# Patient Record
Sex: MALE | Race: BLACK OR AFRICAN AMERICAN | NOT HISPANIC OR LATINO | Employment: OTHER | URBAN - METROPOLITAN AREA
[De-identification: names, ages, dates, MRNs, and addresses within clinical notes are randomized per-mention and may not be internally consistent; named-entity substitution may affect disease eponyms.]

---

## 2020-01-08 ENCOUNTER — HISTORICAL (OUTPATIENT)
Dept: ADMINISTRATIVE | Facility: HOSPITAL | Age: 65
End: 2020-01-08

## 2020-01-10 LAB — FINAL CULTURE: NORMAL

## 2020-10-16 ENCOUNTER — TELEPHONE (OUTPATIENT)
Dept: NEUROSURGERY | Facility: CLINIC | Age: 65
End: 2020-10-16

## 2020-11-02 ENCOUNTER — TELEPHONE (OUTPATIENT)
Dept: NEUROSURGERY | Facility: CLINIC | Age: 65
End: 2020-11-02

## 2020-11-02 NOTE — TELEPHONE ENCOUNTER
Spoke with pt in regards to his appointment on 11/3 I was able to reschedule pts appointment due to Dr Scruggs being booked into emergency sx pt is aware of new appointment time day and location and VU.

## 2020-11-12 ENCOUNTER — TELEPHONE (OUTPATIENT)
Dept: NEUROSURGERY | Facility: CLINIC | Age: 65
End: 2020-11-12

## 2020-11-12 NOTE — TELEPHONE ENCOUNTER
Left message in reference to pt appt on 11/17, Providers will book into sx on that day, appt has been rescheduled for 11/18 at 4pm and left call back number if this time/date doesn't work for pt//ns

## 2020-11-18 ENCOUNTER — TELEPHONE (OUTPATIENT)
Dept: NEUROSURGERY | Facility: CLINIC | Age: 65
End: 2020-11-18

## 2020-11-18 ENCOUNTER — OFFICE VISIT (OUTPATIENT)
Dept: NEUROSURGERY | Facility: CLINIC | Age: 65
End: 2020-11-18
Payer: OTHER GOVERNMENT

## 2020-11-18 VITALS
HEART RATE: 109 BPM | DIASTOLIC BLOOD PRESSURE: 76 MMHG | BODY MASS INDEX: 28.28 KG/M2 | WEIGHT: 197.56 LBS | RESPIRATION RATE: 18 BRPM | HEIGHT: 70 IN | SYSTOLIC BLOOD PRESSURE: 153 MMHG

## 2020-11-18 DIAGNOSIS — Z01.818 ENCOUNTER FOR OTHER PREPROCEDURAL EXAMINATION: ICD-10-CM

## 2020-11-18 PROCEDURE — 99203 OFFICE O/P NEW LOW 30 MIN: CPT | Mod: S$PBB,,, | Performed by: NEUROLOGICAL SURGERY

## 2020-11-18 PROCEDURE — 99999 PR PBB SHADOW E&M-EST. PATIENT-LVL III: CPT | Mod: PBBFAC,,, | Performed by: NEUROLOGICAL SURGERY

## 2020-11-18 PROCEDURE — 99213 OFFICE O/P EST LOW 20 MIN: CPT | Mod: PBBFAC | Performed by: NEUROLOGICAL SURGERY

## 2020-11-18 PROCEDURE — 99999 PR PBB SHADOW E&M-EST. PATIENT-LVL III: ICD-10-PCS | Mod: PBBFAC,,, | Performed by: NEUROLOGICAL SURGERY

## 2020-11-18 PROCEDURE — 99203 PR OFFICE/OUTPT VISIT, NEW, LEVL III, 30-44 MIN: ICD-10-PCS | Mod: S$PBB,,, | Performed by: NEUROLOGICAL SURGERY

## 2020-11-18 RX ORDER — LOSARTAN POTASSIUM 100 MG/1
TABLET ORAL
COMMUNITY
Start: 2020-09-30

## 2020-11-18 RX ORDER — AMLODIPINE BESYLATE 10 MG/1
TABLET ORAL
COMMUNITY
Start: 2020-07-24

## 2020-11-18 RX ORDER — GABAPENTIN 600 MG/1
TABLET ORAL
COMMUNITY
Start: 2020-03-10 | End: 2021-03-11

## 2020-11-18 RX ORDER — HYDRALAZINE HYDROCHLORIDE 25 MG/1
TABLET, FILM COATED ORAL
COMMUNITY
Start: 2020-10-30

## 2020-11-18 RX ORDER — TADALAFIL 20 MG/1
TABLET ORAL
COMMUNITY
Start: 2020-05-27

## 2020-11-18 RX ORDER — DIPHENHYDRAMINE HCL 25 MG
CAPSULE ORAL
COMMUNITY
Start: 2020-10-27 | End: 2021-10-28

## 2020-11-18 RX ORDER — OXYBUTYNIN CHLORIDE 5 MG/1
TABLET ORAL
COMMUNITY
Start: 2020-05-11

## 2020-11-18 RX ORDER — ROSUVASTATIN CALCIUM 20 MG/1
TABLET, COATED ORAL
COMMUNITY
Start: 2020-08-05

## 2020-11-18 NOTE — LETTER
November 18, 2020      The [facility]  [address]  [place] [address]  Phone: [phone]  Fax: [phone]       Patient: Billy Seo   Last 4 SSN: [id number]  YOB: 1955  Date of Visit: 11/18/2020    To Whom It May Concern:    OSIEL Seo  was at [facility] on 11/18/2020. If you have any questions or concerns, or if I can be of further assistance, please do not hesitate to contact me.    Sincerely,      Ortiz Scruggs M.D.

## 2020-11-25 ENCOUNTER — HOSPITAL ENCOUNTER (OUTPATIENT)
Dept: RADIOLOGY | Facility: HOSPITAL | Age: 65
Discharge: HOME | End: 2020-11-25
Attending: NEUROLOGICAL SURGERY
Payer: OTHER GOVERNMENT

## 2020-11-25 DIAGNOSIS — Z01.818 ENCOUNTER FOR OTHER PREPROCEDURAL EXAMINATION: ICD-10-CM

## 2020-11-25 PROCEDURE — 72141 MRI NECK SPINE W/O DYE: CPT | Mod: TC

## 2020-12-01 ENCOUNTER — TELEPHONE (OUTPATIENT)
Dept: NEUROSURGERY | Facility: CLINIC | Age: 65
End: 2020-12-01

## 2020-12-01 NOTE — TELEPHONE ENCOUNTER
----- Message from Julianna Mejia sent at 12/1/2020  2:37 PM CST -----  Contact: SELF/[phone]  Would like to consult with nurse regarding his Mri, please call back at [phone]. Thanks/ar

## 2020-12-03 ENCOUNTER — TELEPHONE (OUTPATIENT)
Dept: NEUROSURGERY | Facility: CLINIC | Age: 65
End: 2020-12-03

## 2020-12-03 RX ORDER — CLONIDINE HYDROCHLORIDE 0.1 MG/1
TABLET ORAL
COMMUNITY
Start: 2020-11-30

## 2020-12-04 ENCOUNTER — TELEPHONE (OUTPATIENT)
Dept: NEUROSURGERY | Facility: CLINIC | Age: 65
End: 2020-12-04

## 2020-12-04 ENCOUNTER — OFFICE VISIT (OUTPATIENT)
Dept: NEUROSURGERY | Facility: CLINIC | Age: 65
End: 2020-12-04
Payer: OTHER GOVERNMENT

## 2020-12-04 DIAGNOSIS — M47.892 OTHER SPONDYLOSIS, CERVICAL REGION: ICD-10-CM

## 2020-12-04 PROCEDURE — 99213 OFFICE O/P EST LOW 20 MIN: CPT | Mod: 95,,, | Performed by: NEUROLOGICAL SURGERY

## 2020-12-04 PROCEDURE — 99213 PR OFFICE/OUTPT VISIT, EST, LEVL III, 20-29 MIN: ICD-10-PCS | Mod: 95,,, | Performed by: NEUROLOGICAL SURGERY

## 2020-12-04 NOTE — TELEPHONE ENCOUNTER
----- Message from Anil Vickers sent at 12/4/2020  3:40 PM CST -----  Patient called in regard to getting a letter stating that he had an appointment with you all and came and got an MRI done. Patient stated he needs the letter to have the appointment date, time, address of the location which he came to for a travel voucher for work.       Best contact number: [phone]

## 2020-12-04 NOTE — TELEPHONE ENCOUNTER
Spoke with pt in regards to portal message informed pt that his letter to ensure his travel pay was sent off pt ERIN.

## 2020-12-14 ENCOUNTER — HOSPITAL ENCOUNTER (OUTPATIENT)
Dept: RADIOLOGY | Facility: HOSPITAL | Age: 65
Discharge: HOME | End: 2020-12-14
Attending: NEUROLOGICAL SURGERY
Payer: OTHER GOVERNMENT

## 2020-12-14 DIAGNOSIS — M47.892 OTHER SPONDYLOSIS, CERVICAL REGION: ICD-10-CM

## 2020-12-14 PROCEDURE — 72125 CT CERVICAL SPINE WITHOUT CONTRAST: ICD-10-PCS | Mod: 26,,, | Performed by: RADIOLOGY

## 2020-12-14 PROCEDURE — 72125 CT NECK SPINE W/O DYE: CPT | Mod: TC

## 2020-12-14 PROCEDURE — 72125 CT NECK SPINE W/O DYE: CPT | Mod: 26,,, | Performed by: RADIOLOGY

## 2021-01-07 ENCOUNTER — TELEPHONE (OUTPATIENT)
Dept: NEUROSURGERY | Facility: CLINIC | Age: 66
End: 2021-01-07

## 2021-01-15 ENCOUNTER — OFFICE VISIT (OUTPATIENT)
Dept: NEUROSURGERY | Facility: CLINIC | Age: 66
End: 2021-01-15
Payer: OTHER GOVERNMENT

## 2021-01-15 DIAGNOSIS — M50.30 DEGENERATIVE DISC DISEASE, CERVICAL: Primary | ICD-10-CM

## 2021-01-15 PROCEDURE — 99024 PR POST-OP FOLLOW-UP VISIT: ICD-10-PCS | Mod: 95,,, | Performed by: NEUROLOGICAL SURGERY

## 2021-01-15 PROCEDURE — 99024 POSTOP FOLLOW-UP VISIT: CPT | Mod: 95,,, | Performed by: NEUROLOGICAL SURGERY

## 2021-02-10 ENCOUNTER — OFFICE VISIT (OUTPATIENT)
Dept: NEUROSURGERY | Facility: CLINIC | Age: 66
End: 2021-02-10
Payer: OTHER GOVERNMENT

## 2021-02-10 VITALS
HEART RATE: 82 BPM | SYSTOLIC BLOOD PRESSURE: 151 MMHG | RESPIRATION RATE: 16 BRPM | WEIGHT: 200.19 LBS | BODY MASS INDEX: 28.66 KG/M2 | DIASTOLIC BLOOD PRESSURE: 71 MMHG | HEIGHT: 70 IN

## 2021-02-10 DIAGNOSIS — G99.2 MYELOPATHY CONCURRENT WITH AND DUE TO SPINAL STENOSIS OF CERVICAL REGION: ICD-10-CM

## 2021-02-10 DIAGNOSIS — M48.062 LUMBAR STENOSIS WITH NEUROGENIC CLAUDICATION: ICD-10-CM

## 2021-02-10 DIAGNOSIS — M48.02 MYELOPATHY CONCURRENT WITH AND DUE TO SPINAL STENOSIS OF CERVICAL REGION: ICD-10-CM

## 2021-02-10 DIAGNOSIS — M50.30 DEGENERATIVE DISC DISEASE, CERVICAL: ICD-10-CM

## 2021-02-10 DIAGNOSIS — M51.36 DEGENERATIVE DISC DISEASE, LUMBAR: ICD-10-CM

## 2021-02-10 DIAGNOSIS — M54.16 LUMBAR RADICULOPATHY: Primary | ICD-10-CM

## 2021-02-10 PROCEDURE — 99214 PR OFFICE/OUTPT VISIT, EST, LEVL IV, 30-39 MIN: ICD-10-PCS | Mod: S$PBB,,, | Performed by: NEUROLOGICAL SURGERY

## 2021-02-10 PROCEDURE — 99214 OFFICE O/P EST MOD 30 MIN: CPT | Mod: PBBFAC | Performed by: NEUROLOGICAL SURGERY

## 2021-02-10 PROCEDURE — 99214 OFFICE O/P EST MOD 30 MIN: CPT | Mod: S$PBB,,, | Performed by: NEUROLOGICAL SURGERY

## 2021-02-10 PROCEDURE — 99999 PR PBB SHADOW E&M-EST. PATIENT-LVL IV: ICD-10-PCS | Mod: PBBFAC,,, | Performed by: NEUROLOGICAL SURGERY

## 2021-02-10 PROCEDURE — 99999 PR PBB SHADOW E&M-EST. PATIENT-LVL IV: CPT | Mod: PBBFAC,,, | Performed by: NEUROLOGICAL SURGERY

## 2021-02-25 ENCOUNTER — TELEPHONE (OUTPATIENT)
Dept: NEUROSURGERY | Facility: CLINIC | Age: 66
End: 2021-02-25

## 2021-05-12 ENCOUNTER — TELEPHONE (OUTPATIENT)
Dept: NEUROSURGERY | Facility: CLINIC | Age: 66
End: 2021-05-12

## 2021-05-12 ENCOUNTER — OFFICE VISIT (OUTPATIENT)
Dept: NEUROSURGERY | Facility: CLINIC | Age: 66
End: 2021-05-12
Payer: OTHER GOVERNMENT

## 2021-05-12 VITALS
BODY MASS INDEX: 27.81 KG/M2 | RESPIRATION RATE: 18 BRPM | WEIGHT: 194.25 LBS | SYSTOLIC BLOOD PRESSURE: 134 MMHG | HEART RATE: 90 BPM | HEIGHT: 70 IN | DIASTOLIC BLOOD PRESSURE: 73 MMHG

## 2021-05-12 DIAGNOSIS — M50.30 DEGENERATIVE DISC DISEASE, CERVICAL: ICD-10-CM

## 2021-05-12 DIAGNOSIS — M51.36 DEGENERATIVE DISC DISEASE, LUMBAR: ICD-10-CM

## 2021-05-12 DIAGNOSIS — M48.062 LUMBAR STENOSIS WITH NEUROGENIC CLAUDICATION: ICD-10-CM

## 2021-05-12 DIAGNOSIS — M54.16 LUMBAR RADICULOPATHY: Primary | ICD-10-CM

## 2021-05-12 PROCEDURE — 99999 PR PBB SHADOW E&M-EST. PATIENT-LVL III: CPT | Mod: PBBFAC,,, | Performed by: NEUROLOGICAL SURGERY

## 2021-05-12 PROCEDURE — 99999 PR PBB SHADOW E&M-EST. PATIENT-LVL III: ICD-10-PCS | Mod: PBBFAC,,, | Performed by: NEUROLOGICAL SURGERY

## 2021-05-12 PROCEDURE — 99212 OFFICE O/P EST SF 10 MIN: CPT | Mod: S$PBB,,, | Performed by: NEUROLOGICAL SURGERY

## 2021-05-12 PROCEDURE — 99212 PR OFFICE/OUTPT VISIT, EST, LEVL II, 10-19 MIN: ICD-10-PCS | Mod: S$PBB,,, | Performed by: NEUROLOGICAL SURGERY

## 2021-05-12 PROCEDURE — 99213 OFFICE O/P EST LOW 20 MIN: CPT | Mod: PBBFAC | Performed by: NEUROLOGICAL SURGERY

## 2021-05-12 RX ORDER — LATANOPROST 50 UG/ML
SOLUTION/ DROPS OPHTHALMIC
COMMUNITY
Start: 2021-05-03 | End: 2021-08-01

## 2021-05-12 RX ORDER — CLOPIDOGREL BISULFATE 75 MG/1
75 TABLET ORAL DAILY
COMMUNITY
Start: 2021-04-12

## 2021-05-12 RX ORDER — TAMSULOSIN HYDROCHLORIDE 0.4 MG/1
CAPSULE ORAL
COMMUNITY
Start: 2020-11-27 | End: 2021-11-28

## 2021-05-12 RX ORDER — OLOPATADINE HYDROCHLORIDE 1 MG/ML
1 SOLUTION/ DROPS OPHTHALMIC 2 TIMES DAILY
COMMUNITY
Start: 2021-04-29

## 2021-05-18 ENCOUNTER — TELEPHONE (OUTPATIENT)
Dept: NEUROSURGERY | Facility: CLINIC | Age: 66
End: 2021-05-18

## 2021-08-20 ENCOUNTER — TELEPHONE (OUTPATIENT)
Dept: NEUROSURGERY | Facility: CLINIC | Age: 66
End: 2021-08-20

## 2022-01-05 NOTE — PROGRESS NOTES
Please note this was an audio visit  Unable to get in touch with the patient via phone  Will schedule at a later date  He will not be charged for this visit

## 2022-01-09 NOTE — PROGRESS NOTES
Subjective:      Patient ID: Billy Seo is a 66 y.o. male.    Chief Complaint: Back Pain    Patient is here today for evaluation of the cervical spine pain  He has a long history of both neck and lower back pain  Rates his pain anywhere between 2 and 4/10 in severity  Occasional have radicular symptoms with numbness and tingling  Denies any weakness  No previous surgeries  He does not have any recent imaging for me to review    Review of Systems   Constitutional: Negative for activity change, appetite change and chills.   HENT: Negative for congestion and ear pain.    Eyes: Negative for photophobia, redness and visual disturbance.   Respiratory: Negative for apnea and chest tightness.    Cardiovascular: Negative for chest pain.   Gastrointestinal: Negative for abdominal distention and abdominal pain.   Endocrine: Negative for cold intolerance.   Genitourinary: Negative for difficulty urinating.   Musculoskeletal: Positive for myalgias, neck pain and neck stiffness. Negative for arthralgias.   Skin: Negative for color change.   Allergic/Immunologic: Negative for environmental allergies.   Neurological: Positive for weakness and numbness. Negative for dizziness.   Hematological: Negative for adenopathy. Does not bruise/bleed easily.   Psychiatric/Behavioral: Negative for agitation, behavioral problems and confusion.         Objective:       Neurosurgery Physical Exam  Ortho/SPM Exam    Nursing note and vitals reviewed  Gen:Oriented to person, place, and time.             Appears stated age   Head:Normocephalic and atraumatic.  Nose: Nose normal.    Eyes: EOM are normal. Pupils are equal, round, and reactive to light.   Neck: Neck supple. No masses or lesions palpated  Cardiovascular: Intact distal pulses.    Abdominal: Soft.   Neurological: Alert and oriented to person, place, and time.  No cranial nerve deficit.  Coordination normal. Normal muscle tone  Psychiatric: Normal mood and affect. Behavior is  normal.    Neck:  None  Paraspinal tenderness   None  Paraspinal muscle spasms   None  Pain with flexion and extention   WNL  Range of motion    Neg  Spurling's sign     Motor   Right Right Left Left  Level Group   5  5  C5 Deltoid   5  5  C6 Bicep   5  5   Wrist extension    5  5  C7 Triceps   5  5   Wrist flexion   5  5  C8    5  5  T1 Interossei    Sensation  NL Decreased (R/L/BL) Level Sensation    X  C5 Lateral upper arm   X  C6 Thumb and index finger, lat forearm   X  C7 Middle finger   X  C8 Ring and little finger   X  T1 Medial arm      Reflex  2+  Bicep tendon   2+  Brachioradialis   2+  Triceps tendon   Not present  Urrutia's   none  Clonus   neg  Tinel's       Assessment:     1. Encounter for other preprocedural examination      Plan:     Encounter for other preprocedural examination  -     Cancel: MRI Cervical Spine Without Contrast; Future; Expected date: 11/18/2020  -     MRI Cervical Spine Without Contrast; Future; Expected date: 11/18/2020    Patient will have an MRI of the cervical spine for better review of the anatomy  Follow-up after imaging is complete    Thank you for the referral   Please call with any questions    Ortiz Scruggs MD  Neurosurgery

## 2022-01-09 NOTE — PROGRESS NOTES
Subjective:      Patient ID: Billy Seo is a 66 y.o. male.    Chief Complaint: No chief complaint on file.    Patient is here today to review imaging of the cervical spine  His an MRI without contrast to review  Since last visit his symptoms are essentially unchanged  Neck pain going into the upper extremities with numbness and tingling                Patient is here today for evaluation of the cervical spine pain  He has a long history of both neck and lower back pain  Rates his pain anywhere between 2 and 4/10 in severity  Occasional have radicular symptoms with numbness and tingling  Denies any weakness  No previous surgeries  He does not have any recent imaging for me to review    Review of Systems   Constitutional: Negative for activity change, appetite change and chills.   HENT: Negative for congestion and ear pain.    Eyes: Negative for photophobia, redness and visual disturbance.   Respiratory: Negative for apnea and chest tightness.    Cardiovascular: Negative for chest pain.   Gastrointestinal: Negative for abdominal distention and abdominal pain.   Endocrine: Negative for cold intolerance.   Genitourinary: Negative for difficulty urinating.   Musculoskeletal: Positive for myalgias, neck pain and neck stiffness. Negative for arthralgias.   Skin: Negative for color change.   Allergic/Immunologic: Negative for environmental allergies.   Neurological: Positive for weakness and numbness. Negative for dizziness.   Hematological: Negative for adenopathy. Does not bruise/bleed easily.   Psychiatric/Behavioral: Negative for agitation, behavioral problems and confusion.         Objective:       Neurosurgery Physical Exam  Ortho/SPM Exam    Nursing note and vitals reviewed  Gen:Oriented to person, place, and time.             Appears stated age   Head:Normocephalic and atraumatic.  Nose: Nose normal.    Eyes: EOM are normal. Pupils are equal, round, and reactive to light.   Neck: Neck supple. No masses or lesions  palpated  Cardiovascular: Intact distal pulses.    Abdominal: Soft.   Neurological: Alert and oriented to person, place, and time.  No cranial nerve deficit.  Coordination normal. Normal muscle tone  Psychiatric: Normal mood and affect. Behavior is normal.    Neck:  None  Paraspinal tenderness   None  Paraspinal muscle spasms   None  Pain with flexion and extention   WNL  Range of motion    Neg  Spurling's sign     Motor   Right Right Left Left  Level Group   5  5  C5 Deltoid   5  5  C6 Bicep   5  5   Wrist extension    5  5  C7 Triceps   5  5   Wrist flexion   5  5  C8    5  5  T1 Interossei    Sensation  NL Decreased (R/L/BL) Level Sensation    X  C5 Lateral upper arm   X  C6 Thumb and index finger, lat forearm   X  C7 Middle finger   X  C8 Ring and little finger   X  T1 Medial arm      Reflex  2+  Bicep tendon   2+  Brachioradialis   2+  Triceps tendon   Not present  Urrutia's   none  Clonus   neg  Tinel's     MRI scan of the cervical spine    Moderate multilevel degenerative disc disease worse at C5-C6 and C6-C7 with borderline spinal canal stenosis measuring up to 10 mm.  Moderate severe right-sided neural foraminal narrowing at C5-C6.  Mild moderate neural foraminal narrowing at the left C5-C6 and  right C7-C6.  Mild left-sided C7-C6 neural foraminal narrowing     Assessment:     1. Other spondylosis, cervical region      Plan:     Other spondylosis, cervical region  -     CT Cervical Spine Without Contrast; Future; Expected date: 12/04/2020    Patient does have degenerative disc disease with mild central stenosis and moderate foraminal stenosis bilaterally which could be contributing to his symptoms  Will get CT scan of the cervical spine to better review his anatomy  He will follow-up after to discuss findings and discuss potential treatment options in the future      Thank you for the referral   Please call with any questions    Ortiz Scruggs MD  Neurosurgery

## 2022-04-10 ENCOUNTER — HISTORICAL (OUTPATIENT)
Dept: ADMINISTRATIVE | Facility: HOSPITAL | Age: 67
End: 2022-04-10
Payer: OTHER GOVERNMENT

## 2022-04-11 ENCOUNTER — TELEPHONE (OUTPATIENT)
Dept: NEUROSURGERY | Facility: CLINIC | Age: 67
End: 2022-04-11
Payer: OTHER GOVERNMENT

## 2022-04-11 NOTE — TELEPHONE ENCOUNTER
Mare w/ pt in regards to his appt cancellation on tomorrow due to his [facility]/Referral is . I informed pt that he can get rescheduled once he get a new autho #.    Pt verbalized understanding.

## 2022-04-25 VITALS
HEIGHT: 70 IN | BODY MASS INDEX: 27.46 KG/M2 | SYSTOLIC BLOOD PRESSURE: 176 MMHG | WEIGHT: 191.81 LBS | DIASTOLIC BLOOD PRESSURE: 76 MMHG | OXYGEN SATURATION: 96 %

## 2022-05-17 ENCOUNTER — TELEPHONE (OUTPATIENT)
Dept: NEUROSURGERY | Facility: CLINIC | Age: 67
End: 2022-05-17
Payer: OTHER GOVERNMENT

## 2022-05-17 NOTE — TELEPHONE ENCOUNTER
I called the pt to inform him that his appt with the provider has been rescheduled to 5/24 d/t the provider being out of the office on 5/20.. I left the pt a urgent VM explaining the above message.

## 2022-05-24 ENCOUNTER — OFFICE VISIT (OUTPATIENT)
Dept: NEUROSURGERY | Facility: CLINIC | Age: 67
End: 2022-05-24
Payer: OTHER GOVERNMENT

## 2022-05-24 VITALS
HEIGHT: 70 IN | WEIGHT: 194 LBS | BODY MASS INDEX: 27.77 KG/M2 | RESPIRATION RATE: 18 BRPM | DIASTOLIC BLOOD PRESSURE: 74 MMHG | SYSTOLIC BLOOD PRESSURE: 158 MMHG | HEART RATE: 91 BPM

## 2022-05-24 DIAGNOSIS — T14.8XXA MUSCULOLIGAMENTOUS STRAIN: Primary | ICD-10-CM

## 2022-05-24 DIAGNOSIS — M51.36 DEGENERATIVE DISC DISEASE, LUMBAR: ICD-10-CM

## 2022-05-24 DIAGNOSIS — D17.0 LIPOMA OF NECK: ICD-10-CM

## 2022-05-24 DIAGNOSIS — Z01.818 ENCOUNTER FOR OTHER PREPROCEDURAL EXAMINATION: ICD-10-CM

## 2022-05-24 DIAGNOSIS — M50.30 DEGENERATIVE DISC DISEASE, CERVICAL: ICD-10-CM

## 2022-05-24 PROCEDURE — 99214 OFFICE O/P EST MOD 30 MIN: CPT | Mod: S$PBB,,, | Performed by: NEUROLOGICAL SURGERY

## 2022-05-24 PROCEDURE — 99214 PR OFFICE/OUTPT VISIT, EST, LEVL IV, 30-39 MIN: ICD-10-PCS | Mod: S$PBB,,, | Performed by: NEUROLOGICAL SURGERY

## 2022-05-24 PROCEDURE — 99999 PR PBB SHADOW E&M-EST. PATIENT-LVL V: ICD-10-PCS | Mod: PBBFAC,,, | Performed by: NEUROLOGICAL SURGERY

## 2022-05-24 PROCEDURE — 99999 PR PBB SHADOW E&M-EST. PATIENT-LVL V: CPT | Mod: PBBFAC,,, | Performed by: NEUROLOGICAL SURGERY

## 2022-05-24 PROCEDURE — 99215 OFFICE O/P EST HI 40 MIN: CPT | Mod: PBBFAC | Performed by: NEUROLOGICAL SURGERY

## 2022-05-24 RX ORDER — METHOCARBAMOL 750 MG/1
750 TABLET, FILM COATED ORAL 3 TIMES DAILY PRN
Qty: 60 TABLET | Refills: 0 | Status: SHIPPED | OUTPATIENT
Start: 2022-05-24

## 2022-05-24 RX ORDER — METHOCARBAMOL 750 MG/1
750 TABLET, FILM COATED ORAL 3 TIMES DAILY PRN
Qty: 60 TABLET | Refills: 0 | Status: SHIPPED | OUTPATIENT
Start: 2022-05-24 | End: 2022-05-24 | Stop reason: CLARIF

## 2022-05-24 RX ORDER — METHOCARBAMOL 750 MG/1
750 TABLET, FILM COATED ORAL 3 TIMES DAILY PRN
Qty: 60 TABLET | Refills: 0 | Status: SHIPPED | OUTPATIENT
Start: 2022-05-24 | End: 2022-05-24

## 2022-05-24 NOTE — LETTER
May 24, 2022      The [facility]  [address]  [place] [address]  Phone: [phone]  Fax: [phone]       Patient: Billy Seo   YOB: 1955  Date of Visit: 05/24/2022    To Whom It May Concern:    Beryl Seo  was at [facility] on 05/24/2022.      Sincerely,        Verna Hawk MA

## 2022-05-24 NOTE — PROGRESS NOTES
"Subjective:      Patient ID: Billy Seo is a 66 y.o. male.    HPI     Here for follow up   Since last time I did see him he has had placed in his legs   This alleviated a number of his leg symptoms      States that several weeks ago caught a really bad "spasms to his lower lumbar area  States his his back " seized up"   Went to PCP   Given short course of muscle relaxers and an outpatient MR was ordered   This was 2.5 weeks ago   Since then his symptoms are much better but still present   Back pain relates to his R side into the muscle    at the time pain 10/10  Now pain 0/10 but worsens with activity   He is very careful with hsi movements since this episode     Also notes a lump to the back of his R neck which has been present and bothersome for the past 9 months   No arm pain   No N/T or weakness   Ambulates unassisted         Objective:     Body mass index is 27.84 kg/m².  Vitals:    05/24/22 1352   BP: (!) 158/74   Pulse: 91   Resp: 18        Back:  None Spasms bilateral  Fairly  Paraspinal muscle spasms   None  Pain with flexion and extention   WNL Limited secondary to pain    Range of motion    Neg  Straight leg raise     Motor   Right Right Left Left  Level Group   5  5  L2 Hip flexor (Psoas)   5  5  L3 Leg extension (Quads)   5  5  L4 Dorsiflexion & foot inversion (Tibialis Anterior)   5  5  L5 Great toe extension ( EHL)   5  5  S1 Foot eversion (Gastroc, PL & PB)     Sensation  NL Decreased (R/L/BL) Level Sensation    X  L2 Anterio-medial thigh   X  L3 Medial thigh around knee   X  L4 Medial foot   X  L5 Dorsum foot   X  S1 Lateral foot     Reflex  2+  Patellar tendon (L4)   2+  Achilles tendon (S1)      there is a superficial lump on the R side of neck   Well cirumscribed   Mobile     No results found for: HSCRP, WBC, HCT      Assessment:     1. Musculoligamentous strain    2. Degenerative disc disease, lumbar    3. Degenerative disc disease, cervical    4. Lipoma of neck      Plan: "     Musculoligamentous strain    Degenerative disc disease, lumbar    Degenerative disc disease, cervical    Lipoma of neck    Other orders  -     Discontinue: methocarbamoL (ROBAXIN) 750 MG Tab; Take 1 tablet (750 mg total) by mouth 3 (three) times daily as needed (muscle spasms).  Dispense: 60 tablet; Refill: 0      Pt here for follow up  I think his back pain more related to spasms goning   [facility]     MR of the cervical spine this superficial lump appears to be a small lipoma which   Will follow up after this is complete       Ortiz Scruggs MD  [place] [facility]

## 2022-06-03 ENCOUNTER — TELEPHONE (OUTPATIENT)
Dept: NEUROSURGERY | Facility: CLINIC | Age: 67
End: 2022-06-03
Payer: OTHER GOVERNMENT

## 2022-06-03 NOTE — TELEPHONE ENCOUNTER
I spoke with the pt and he stated he thought we could [facility] to [facility]. I informed the pt that we printed [facility] himself. He stated he wanted to see if we could electronically send it in d/t him needing to schedule a appt to bring in a Rx to be filled.. I informed him that we are not electronically setup with the [facility] to send [facility].    Pt verbalized understanding          ----- Message from Mikaela Vinson MA sent at 6/2/2022  4:28 PM CDT -----  Contact: [phone]  Not sure if you handled this or not?   ----- Message -----  From: Abbey Mann  Sent: 6/2/2022   3:22 PM CDT  To: Sheba Alcant[facility] Staff    Patient would like to consult with a nurse in regards to a prescription request. Please call back at [phone]. Thanks r/s

## 2022-06-13 ENCOUNTER — TELEPHONE (OUTPATIENT)
Dept: NEUROSURGERY | Facility: CLINIC | Age: 67
End: 2022-06-13
Payer: OTHER GOVERNMENT

## 2022-06-13 NOTE — TELEPHONE ENCOUNTER
I spoke with the pt and informed him that the provider will be out of the office on 6/17 and we needed to reschedule his appt.. I informed the pt that his new f/u with the provider is scheduled for 7/1 and he is to continue with his MRI scheduled on 6/17 at 12pm    The pt verbalized understanding

## 2022-06-17 ENCOUNTER — HOSPITAL ENCOUNTER (OUTPATIENT)
Dept: RADIOLOGY | Facility: HOSPITAL | Age: 67
Discharge: HOME | End: 2022-06-17
Attending: NEUROLOGICAL SURGERY
Payer: OTHER GOVERNMENT

## 2022-06-17 DIAGNOSIS — Z01.818 ENCOUNTER FOR OTHER PREPROCEDURAL EXAMINATION: ICD-10-CM

## 2022-06-17 DIAGNOSIS — D17.0 LIPOMA OF NECK: ICD-10-CM

## 2022-06-17 PROCEDURE — 72141 MRI CERVICAL SPINE WITHOUT CONTRAST: ICD-10-PCS | Mod: 26,,, | Performed by: RADIOLOGY

## 2022-06-17 PROCEDURE — 72141 MRI NECK SPINE W/O DYE: CPT | Mod: TC

## 2022-06-17 PROCEDURE — 72141 MRI NECK SPINE W/O DYE: CPT | Mod: 26,,, | Performed by: RADIOLOGY

## 2022-07-05 ENCOUNTER — OFFICE VISIT (OUTPATIENT)
Dept: NEUROSURGERY | Facility: CLINIC | Age: 67
End: 2022-07-05
Payer: OTHER GOVERNMENT

## 2022-07-05 VITALS
HEIGHT: 70 IN | HEART RATE: 73 BPM | SYSTOLIC BLOOD PRESSURE: 144 MMHG | RESPIRATION RATE: 18 BRPM | BODY MASS INDEX: 27.77 KG/M2 | WEIGHT: 194 LBS | DIASTOLIC BLOOD PRESSURE: 71 MMHG

## 2022-07-05 DIAGNOSIS — M54.9 DORSALGIA, UNSPECIFIED: ICD-10-CM

## 2022-07-05 DIAGNOSIS — M54.16 LUMBAR RADICULOPATHY: ICD-10-CM

## 2022-07-05 DIAGNOSIS — D17.0 LIPOMA OF NECK: Primary | ICD-10-CM

## 2022-07-05 PROCEDURE — 99999 PR PBB SHADOW E&M-EST. PATIENT-LVL V: ICD-10-PCS | Mod: PBBFAC,,, | Performed by: PHYSICIAN ASSISTANT

## 2022-07-05 PROCEDURE — 99213 PR OFFICE/OUTPT VISIT, EST, LEVL III, 20-29 MIN: ICD-10-PCS | Mod: S$PBB,,, | Performed by: PHYSICIAN ASSISTANT

## 2022-07-05 PROCEDURE — 99215 OFFICE O/P EST HI 40 MIN: CPT | Mod: PBBFAC | Performed by: PHYSICIAN ASSISTANT

## 2022-07-05 PROCEDURE — 99999 PR PBB SHADOW E&M-EST. PATIENT-LVL V: CPT | Mod: PBBFAC,,, | Performed by: PHYSICIAN ASSISTANT

## 2022-07-05 PROCEDURE — 99213 OFFICE O/P EST LOW 20 MIN: CPT | Mod: S$PBB,,, | Performed by: PHYSICIAN ASSISTANT

## 2022-07-05 NOTE — PROGRESS NOTES
"Subjective:      Patient ID: Billy Seo is a 66 y.o. male.    HPI:  Follow-up (Pt is here today for a f/u w/ MRI. Pt c/o pain in lower back area and is not in any pain but when he is, it is movement makes it worse and the flexeril he is currently taking makes it better.)    The patient is here today for MRI review of the cervical spine.  He was previously seen by Dr. Scruggs.   His main concern is lower back pain- MOSTLY on the L side.   In the past he had stents placed in both legs (June and July 2021).  After stents placed he has noticed stiffness in both legs.  Even with leaning forward he has a difficult time doing certain things around the home- cooking, dishes.    Patient admits to using a weighted ball (exercise ball) which helps with the back pain.   IN regards to the neck, he c/o spasms. Denies arm pain.  Lipoma is not painful at this time.    Last note:  Since last time I did see him he has had placed in his legs   This alleviated a number of his leg symptoms     States that several weeks ago caught a really bad "spasms to his lower lumbar area  States his his back " seized up"   Went to PCP   Given short course of muscle relaxers and an outpatient MR was ordered   This was 2.5 weeks ago   Since then his symptoms are much better but still present   Back pain relates to his R side into the muscle    at the time pain 10/10  Now pain 0/10 but worsens with activity   He is very careful with hsi movements since this episode    Also notes a lump to the back of his R neck which has been present and bothersome for the past 9 months   No arm pain   No N/T or weakness   Ambulates unassisted           Objective:     Body mass index is 27.84 kg/m².  Vitals:    07/05/22 1335   BP: (!) 144/71   Pulse: 73   Resp: 18                Neck:  None  Paraspinal tenderness   None  Paraspinal muscle spasms   None  Pain with flexion and extention   WNL  Range of motion shoulders   Neg Not tested Spurling's sign     Motor:   " Right Right Left Left  Level Group   5  5  C5 Deltoid   5  5  C6 Bicep   5  5   Wrist extension    5  5  C7 Triceps   5  5   Wrist flexion   5  5  C8    5  5  T1 Interossei      Sensation:  NL Decreased (R/L/BL) Level Sensation    [x]   C5 Lateral upper arm   [x]   C6 Thumb and index finger, lat forearm   [x]   C7 Middle finger   [x]   C8 Ring and little finger   [x]   T1 Medial arm         Back:  None Present- left lumbar Paraspinal tenderness   None Present- left lubmar Paraspinal muscle spasms   None present Pain with flexion and extention   WNL limited Range of motion    Neg  Straight leg raise     Motor:   Right Right Left Left  Level Group   5  5  L2 Hip flexor (Psoas)   5  5  L3 Leg extension (Quads)   5  5  L4 Dorsiflexion & foot inversion (Tibialis Anterior)   5  5  L5 Great toe extension ( EHL)   5  5  S1 Foot eversion (Gastroc, PL & PB)     Sensation:  NL Decreased (R/L/BL) Level Sensation    X  L2 Anterio-medial thigh   X  L3 Medial thigh around knee   X  L4 Medial foot   X  L5 Dorsum foot   X  S1 Lateral foot        No results found for: HSCRP, WBC, HCT      Results for orders placed during the hospital encounter of 06/17/22    MRI Cervical Spine Without Contrast    FINDINGS:  Minimal retrolisthesis of C5 on C6. No cervical spine fracture.  Vertebral bodies demonstrate normal signal intensity on all sequences.  Mild disc height loss and desiccation involves the C3-C4, C4-C5, C5-C6, and C6-C7 disc spaces.  The craniocervical junction is normal.  The visualized portions of the skull base and the posterior fossa are normal.  The spinal cord demonstrates normal signal intensity on all sequences. There is a 2.4 x 3 cm lipoma within the right posterior neck subcutaneous fat (series 2, image 14; series 7, image 107).    C2-C3: Minimal posterior disc osteophyte complex.  No significant facet or uncovertebral arthropathy.  There is no neuroforaminal stenosis.  There is no spinal canal stenosis.    C3-C4:  There is a prominent posterior disc osteophyte complex which results in mild to moderate spinal canal stenosis at this level.  No significant facet or uncovertebral arthropathy.  There is no neuroforaminal stenosis.    C4-C5: Posterior disc osteophyte complex results in minimal spinal canal stenosis at this level.  No significant facet or uncovertebral arthropathy.  There is no neuroforaminal stenosis.    C5-C6: Posterior disc osteophyte complex results in minimal spinal canal stenosis at this level.  Mild to moderate bilateral neural foraminal stenosis is secondary to components of the disc osteophyte complex, uncovertebral arthropathy, and facet arthropathy.    C6-C7: Posterior disc osteophyte complex results in minimal spinal canal stenosis at this level.  Moderate bilateral neural foraminal stenosis secondary to components of the disc osteophyte complex, uncovertebral arthropathy, and facet arthropathy.    C7-T1: No disc bulge.  Mild right neural foraminal stenosis secondary to prominent facet arthropathy.  No significant left neural foraminal stenosis or spinal canal stenosis.    Impression  1.  Multilevel cervical spine degenerative changes resulting in areas of neural foraminal and spinal canal stenosis as described above.  Overall, findings are similar to prior cervical spine MRI from November 2020.  2.  2.4 x 3 cm lipoma within the right posterior neck subcutaneous fat.    INDEPENDENT INTERPRETATION OF TEST:      Assessment:     1. Lipoma of neck    2. Dorsalgia, unspecified    3. Lumbar radiculopathy      Plan:     Lipoma of neck    Dorsalgia, unspecified  -     MRI Lumbar Spine Without Contrast; Future; Expected date: 07/05/2022    Lumbar radiculopathy        Reviewed recent MRI of cervical spine- patient made aware of degen findings and superficial lipoma.  At this time, he does not want to move forward with removal of this lipoma but will consider later this year.    He wants to focus on his lower back  due to persistent L sided pain.   Will obtain updated MRI of the lumbar spine and see him back to discuss findings and possible tx options.    EL[facility]

## 2022-07-25 ENCOUNTER — HOSPITAL ENCOUNTER (OUTPATIENT)
Dept: RADIOLOGY | Facility: HOSPITAL | Age: 67
Discharge: HOME | End: 2022-07-25
Attending: PHYSICIAN ASSISTANT
Payer: OTHER GOVERNMENT

## 2022-07-25 DIAGNOSIS — M54.9 DORSALGIA, UNSPECIFIED: ICD-10-CM

## 2022-07-25 PROCEDURE — 72148 MRI LUMBAR SPINE W/O DYE: CPT | Mod: TC

## 2022-07-25 PROCEDURE — 72148 MRI LUMBAR SPINE WITHOUT CONTRAST: ICD-10-PCS | Mod: 26,,, | Performed by: RADIOLOGY

## 2022-07-25 PROCEDURE — 72148 MRI LUMBAR SPINE W/O DYE: CPT | Mod: 26,,, | Performed by: RADIOLOGY

## 2022-08-02 ENCOUNTER — OFFICE VISIT (OUTPATIENT)
Dept: NEUROSURGERY | Facility: CLINIC | Age: 67
End: 2022-08-02
Payer: OTHER GOVERNMENT

## 2022-08-02 VITALS
DIASTOLIC BLOOD PRESSURE: 84 MMHG | RESPIRATION RATE: 18 BRPM | BODY MASS INDEX: 27.77 KG/M2 | SYSTOLIC BLOOD PRESSURE: 165 MMHG | WEIGHT: 194 LBS | HEART RATE: 88 BPM | HEIGHT: 70 IN

## 2022-08-02 DIAGNOSIS — M54.50 CHRONIC LEFT-SIDED LOW BACK PAIN WITHOUT SCIATICA: ICD-10-CM

## 2022-08-02 DIAGNOSIS — M51.36 DEGENERATIVE DISC DISEASE, LUMBAR: Primary | ICD-10-CM

## 2022-08-02 DIAGNOSIS — G89.29 CHRONIC LEFT-SIDED LOW BACK PAIN WITHOUT SCIATICA: ICD-10-CM

## 2022-08-02 DIAGNOSIS — T14.8XXA MUSCULOLIGAMENTOUS STRAIN: ICD-10-CM

## 2022-08-02 PROCEDURE — 99213 OFFICE O/P EST LOW 20 MIN: CPT | Mod: S$PBB,,, | Performed by: NEUROLOGICAL SURGERY

## 2022-08-02 PROCEDURE — 99999 PR PBB SHADOW E&M-EST. PATIENT-LVL III: ICD-10-PCS | Mod: PBBFAC,,, | Performed by: NEUROLOGICAL SURGERY

## 2022-08-02 PROCEDURE — 99213 OFFICE O/P EST LOW 20 MIN: CPT | Mod: PBBFAC | Performed by: NEUROLOGICAL SURGERY

## 2022-08-02 PROCEDURE — 99213 PR OFFICE/OUTPT VISIT, EST, LEVL III, 20-29 MIN: ICD-10-PCS | Mod: S$PBB,,, | Performed by: NEUROLOGICAL SURGERY

## 2022-08-02 PROCEDURE — 99999 PR PBB SHADOW E&M-EST. PATIENT-LVL III: CPT | Mod: PBBFAC,,, | Performed by: NEUROLOGICAL SURGERY

## 2022-08-02 NOTE — LETTER
August 2, 2022      The [facility] 1st Fl  [address]  [place] [address]  Phone: [phone]  Fax: [phone]       Patient: Billy Seo   YOB: 1955  Date of Visit: 08/02/2022    To Whom It May Concern:    Beryl Seo  was at [facility] on 08/02/2022.    If you have any questions or concerns, or if I can be of further assistance, please do not hesitate to contact me.    Sincerely,    Verna Hawk MA

## 2022-10-24 ENCOUNTER — TELEPHONE (OUTPATIENT)
Dept: NEUROSURGERY | Facility: CLINIC | Age: 67
End: 2022-10-24
Payer: OTHER GOVERNMENT

## 2022-10-24 NOTE — TELEPHONE ENCOUNTER
I spoke with the pt and he wanted to know if he can get a 2nd opinion in regards to needing stents placed. The pt stated he wants to know if he should get a 2nd opinion because of him wanting to continue to live a good life.. I informed the pt that he will need to reach out to a neurovascular or cardiologist in regards to having stents placed d/t blood clot being found..    Understanding was verbalized        ----- Message from Joyce Bolden sent at 10/24/2022  1:36 PM CDT -----  Contact: Patient @ [phone]  Patient is returning a phone call.  Who left a message for the patient: Verna Hawk MA   Does patient know what this is regarding:    Would you like a call back, or a response through your [facility] portal?:   call   Comments:

## 2022-10-24 NOTE — TELEPHONE ENCOUNTER
I tried calling the pt and was unsuccessful... LVM      ----- Message from Yanet Crump sent at 10/24/2022  1:13 PM CDT -----  Regardinnd opinion  Contact: Patient  Patient needs to speak to Dr Scruggs, patient states that he was told by his cardiologist that he needs to have stints in his legs done again, please call him back at [phone]

## 2022-11-04 ENCOUNTER — TELEPHONE (OUTPATIENT)
Dept: NEUROSURGERY | Facility: CLINIC | Age: 67
End: 2022-11-04
Payer: OTHER GOVERNMENT

## 2022-11-04 NOTE — TELEPHONE ENCOUNTER
I spoke with the pt and he wanted to know if he can get a 2nd opinion in regards to needing stents placed. The pt stated he wants to know if he should get a 2nd opinion because of him wanting to continue to live a good life.. I informed the pt that he will need to reach out to a neurovascular or cardiologist in regards to having stents placed d/t blood clot being found..     Understanding was verbalized      ----- Message from Patricia Disla sent at 11/4/2022  1:27 PM CDT -----  Contact: haily  Patient is calling to speak with the nurse regarding questions and concerns. Reports stints not working and may need and appointment. Also states wanting another opinion. Please give patient a call back at [phone]  Thanks paul

## 2022-12-19 NOTE — PROGRESS NOTES
Subjective:      Patient ID: Billy Seo is a 67 y.o. male.    HPI:  Follow-up (Pt is here today for a f/u to discuss mri results with Dr Scruggs pt states that he's not in any pain he's just having some stiffness in his right calf pt denies PT & takes Robaxin PRN for pain. )    Patient is here today for follow-up with an MRI of the lumbar spine for review  At the last visit he was seen for his cervical spine where an incidental lipoma was found.  He was asymptomatic at that time and did not wish to have it removed.    In terms of his lower back he has had intermittent lower back pain which he we have seen before in clinic.  Last visit pain was worse today symptoms are slightly better.  States that he is not actually having back pain however he is having soreness and stiffness to his back as well as his right calf.  Takes Robaxin as needed for symptom relief.  No weakness or numbness and tingling.  No prior back surgery    Objective:     Body mass index is 27.84 kg/m².  Vitals:    08/02/22 1449   BP: (!) 165/84   Pulse: 88   Resp: 18            Motor:   Right Right Left Left  Level Group   5  5  L2 Hip flexor (Psoas)   5  5  L3 Leg extension (Quads)   5  5  L4 Dorsiflexion & foot inversion (Tibialis Anterior)   5  5  L5 Great toe extension ( EHL)   5  5  S1 Foot eversion (Gastroc, PL & PB)     Sensation:  NL Decreased (R/L/BL) Level Sensation    X  L2 Anterio-medial thigh   X  L3 Medial thigh around knee   X  L4 Medial foot   X  L5 Dorsum foot   X  S1 Lateral foot     MR Lumbar   L3-4: Intervertebral disc bulge.  Mild facet hypertrophy and ligamentum flavum thickening.  The thecal sac is borderline measuring 10 mm.  Moderate bilateral neural foraminal narrowing.     L4-5: Intervertebral disc bulge.  Moderate facet hypertrophy and ligamentum flavum thickening.  Moderate bilateral neural foraminal narrowing.  No thecal sac narrowing.     L5-S1: Intervertebral disc bulge.  Mild facet hypertrophy and ligamentum flavum  thickening.  Mild bilateral neural foraminal narrowing.  No thecal sac narrowing.           Assessment:     1. Degenerative disc disease, lumbar    2. Musculoligamentous strain    3. Chronic left-sided low back pain without sciatica        Plan:     Degenerative disc disease, lumbar    Musculoligamentous strain    Chronic left-sided low back pain without sciatica      Given patient's symptoms along with the radiology findings he has mild degenerative disc disease with minimal to moderate foraminal stenosis at the L3 to the S1 levels.  Fortunately since last visit his symptoms have improved.  He was complaining of point tenderness to the left side consistent with musculoligamentous strain.  He will continue taking Robaxin as needed for symptom relief.  If his symptoms do not improve I have recommended referral to PT for range of motion and core strengthening exercises.  No surgical intervention planned for the lumbar spine at this time.  Patient will follow-up as directed    I have again addressed his lipoma of the neck if at any point in time it becomes bothersome or wishes to have this removed please return back as well    Thank you for the referral   Please call with any questions    Ortiz Scruggs MD  Neurosurgery     Disclaimer: This note was prepared using a voice recognition system and is likely to have sound alike errors within the text.

## 2023-03-14 ENCOUNTER — OFFICE VISIT (OUTPATIENT)
Dept: NEUROSURGERY | Facility: CLINIC | Age: 68
End: 2023-03-14
Payer: OTHER GOVERNMENT

## 2023-03-14 VITALS
HEIGHT: 70 IN | RESPIRATION RATE: 18 BRPM | BODY MASS INDEX: 27.77 KG/M2 | WEIGHT: 194 LBS | SYSTOLIC BLOOD PRESSURE: 173 MMHG | HEART RATE: 111 BPM | DIASTOLIC BLOOD PRESSURE: 76 MMHG

## 2023-03-14 DIAGNOSIS — K08.89 PAIN IN A TOOTH OR TEETH: ICD-10-CM

## 2023-03-14 DIAGNOSIS — M51.36 DEGENERATIVE DISC DISEASE, LUMBAR: ICD-10-CM

## 2023-03-14 DIAGNOSIS — M50.30 DEGENERATIVE DISC DISEASE, CERVICAL: Primary | ICD-10-CM

## 2023-03-14 PROCEDURE — 99999 PR PBB SHADOW E&M-EST. PATIENT-LVL IV: CPT | Mod: PBBFAC,,, | Performed by: NEUROLOGICAL SURGERY

## 2023-03-14 PROCEDURE — 99999 PR PBB SHADOW E&M-EST. PATIENT-LVL IV: ICD-10-PCS | Mod: PBBFAC,,, | Performed by: NEUROLOGICAL SURGERY

## 2023-03-14 PROCEDURE — 99212 PR OFFICE/OUTPT VISIT, EST, LEVL II, 10-19 MIN: ICD-10-PCS | Mod: S$PBB,,, | Performed by: NEUROLOGICAL SURGERY

## 2023-03-14 PROCEDURE — 99212 OFFICE O/P EST SF 10 MIN: CPT | Mod: S$PBB,,, | Performed by: NEUROLOGICAL SURGERY

## 2023-03-14 PROCEDURE — 99214 OFFICE O/P EST MOD 30 MIN: CPT | Mod: PBBFAC | Performed by: NEUROLOGICAL SURGERY

## 2023-03-14 NOTE — PROGRESS NOTES
Subjective:      Patient ID: Billy Seo is a 67 y.o. male.    Chief Complaint: Follow-up (Pt is here today c/o back pain rating pain 2/10. Pt states that the pain worsens when he walks pt denies any recent falls and currently takes Robaxin & Gabapentin to help ease pain.)    I have previously seen this patient for his cervical spine  Long history of neck pain going into the shoulders bilaterally  Tried multiple conservative treatments without any lasting relief  Of note today he states that he has been having jaw pain and he is worried that he has an infection in 1 of his teeth  He has no fevers no facial swelling no difficulty swallowing, breathing or other associated symptoms  He is scheduled to see the dentist later next week however he was worried that he may have an infection and was able to get an appointment with me today to see if I would give him antibiotics to prophylactically treat any infection  His neck pain has remained stable  No new weakness or any numbness and tingling into the hands    No new lower back pain or lower extremity symptoms  Denies any bowel bladder or any other neurologic issues at this time      Review of Systems   Constitutional:  Negative for activity change, appetite change and chills.   HENT:  Negative for congestion and ear pain.    Eyes:  Negative for photophobia, redness and visual disturbance.   Respiratory:  Negative for apnea and chest tightness.    Cardiovascular:  Negative for chest pain.   Gastrointestinal:  Negative for abdominal distention and abdominal pain.   Endocrine: Negative for cold intolerance.   Genitourinary:  Negative for difficulty urinating.   Musculoskeletal:  Positive for myalgias, neck pain and neck stiffness. Negative for arthralgias.   Skin:  Negative for color change.   Allergic/Immunologic: Negative for environmental allergies.   Neurological:  Negative for dizziness, weakness and numbness.   Hematological:  Negative for adenopathy. Does not  bruise/bleed easily.   Psychiatric/Behavioral:  Negative for agitation, behavioral problems and confusion.        Objective:       Nursing note and vitals reviewed  Gen:Oriented to person, place, and time.             Appears stated age   Head:Normocephalic and atraumatic.  Nose: Nose normal.    Eyes: EOM are normal. Pupils are equal, round, and reactive to light.   Neck: Neck supple. No masses or lesions palpated  Cardiovascular: Intact distal pulses.    Abdominal: Soft.   Neurological: Alert and oriented to person, place, and time.  No cranial nerve deficit.  Coordination normal. Normal muscle tone  Psychiatric: Normal mood and affect. Behavior is normal.    Neck:  None  Paraspinal tenderness   None  Paraspinal muscle spasms   None  Pain with flexion and extention   WNL  Range of motion    Neg  Spurling's sign     Motor   Right Right Left Left  Level Group   5  5  C5 Deltoid   5  5  C6 Bicep   5  5   Wrist extension    5  5  C7 Triceps   5  5   Wrist flexion   5  5  C8    5  5  T1 Interossei    Sensation  NL Decreased (R/L/BL) Level Sensation    X  C5 Lateral upper arm   X  C6 Thumb and index finger, lat forearm   X  C7 Middle finger   X  C8 Ring and little finger   X  T1 Medial arm      Reflex  2+  Bicep tendon   2+  Brachioradialis   2+  Triceps tendon   Not present  Urrutia's   none  Clonus   neg  Tinel's      MRI cervical   1.  Multilevel cervical spine degenerative changes resulting in areas of neural foraminal and spinal canal stenosis as described above.  Overall, findings are similar to prior cervical spine MRI from November 2020.     2.  2.4 x 3 cm lipoma within the right posterior neck subcutaneous fat.  No new imaging to review         Assessment:     1. Degenerative disc disease, cervical    2. Degenerative disc disease, lumbar    3. Pain in a tooth or teeth      Plan:     Degenerative disc disease, cervical    Degenerative disc disease, lumbar    Pain in a tooth or teeth    I explained to the  patient that I wouldn't know what antibiotic to apophylactically place him on for a possible tooth infection   Lisbeth directed him to our urgent care and encouraged him to contact his dentist to give him further instructions regarding this       Thank you for the referral   Please call with any questions    Ortiz Scruggs MD  Neurosurgery     Disclaimer: This note was prepared using a voice recognition system and is likely to have sound alike errors within the text.

## 2023-08-25 ENCOUNTER — TELEPHONE (OUTPATIENT)
Dept: NEUROSURGERY | Facility: CLINIC | Age: 68
End: 2023-08-25
Payer: OTHER GOVERNMENT

## 2023-08-25 NOTE — TELEPHONE ENCOUNTER
----- Message from Leni Ceja sent at 8/25/2023 11:53 AM CDT -----  [facility]  [facility] to speak to the nurse regarding the patient visit on 03/14/23 she would like all documentation sent to     Thanks  LJ

## 2024-02-09 ENCOUNTER — OFFICE VISIT (OUTPATIENT)
Dept: NEUROSURGERY | Facility: CLINIC | Age: 69
End: 2024-02-09
Payer: OTHER GOVERNMENT

## 2024-02-09 VITALS
HEART RATE: 99 BPM | BODY MASS INDEX: 26.82 KG/M2 | WEIGHT: 186.94 LBS | SYSTOLIC BLOOD PRESSURE: 172 MMHG | DIASTOLIC BLOOD PRESSURE: 87 MMHG

## 2024-02-09 DIAGNOSIS — M51.36 DEGENERATIVE DISC DISEASE, LUMBAR: Primary | ICD-10-CM

## 2024-02-09 DIAGNOSIS — T14.8XXA MUSCULOLIGAMENTOUS STRAIN: ICD-10-CM

## 2024-02-09 DIAGNOSIS — M50.30 DEGENERATIVE DISC DISEASE, CERVICAL: ICD-10-CM

## 2024-02-09 PROCEDURE — 99212 OFFICE O/P EST SF 10 MIN: CPT | Mod: S$PBB,,, | Performed by: NEUROLOGICAL SURGERY

## 2024-02-09 PROCEDURE — 99999 PR PBB SHADOW E&M-EST. PATIENT-LVL II: CPT | Mod: PBBFAC,,, | Performed by: NEUROLOGICAL SURGERY

## 2024-02-09 PROCEDURE — 99212 OFFICE O/P EST SF 10 MIN: CPT | Mod: PBBFAC | Performed by: NEUROLOGICAL SURGERY

## 2024-02-09 NOTE — PROGRESS NOTES
Subjective:      Patient ID: Billy Seo is a 68 y.o. male.    Chief Complaint: No chief complaint on file.    Pt here today for follow up   He has a CT lumbar scheduled for the end of the month  Was suppose to return after this was completed   Back pain rated 4/10   States that when he lays down has N/T into feet  Only when he lays down   Occasionally will have symptoms into hands but this doesn't really bother him   He is planning on moving to [facility] shortly       Review of Systems   Constitutional:  Negative for activity change, appetite change and chills.   HENT:  Negative for hearing loss, sore throat and tinnitus.    Eyes:  Negative for pain, discharge and itching.   Cardiovascular:  Negative for chest pain.   Gastrointestinal:  Negative for abdominal pain.   Endocrine: Negative for cold intolerance and heat intolerance.   Genitourinary:  Negative for difficulty urinating and dysuria.   Musculoskeletal:  Positive for back pain. Negative for gait problem.   Allergic/Immunologic: Negative for environmental allergies.   Neurological:  Negative for dizziness, tremors, weakness, light-headedness and headaches.   Hematological:  Negative for adenopathy.   Psychiatric/Behavioral:  Negative for agitation, behavioral problems and confusion.          Objective:       Physical Exam:  Nursing note and vitals reviewed.    Constitutional: He appears well-nourished. He is not diaphoretic. No distress.     Eyes: Pupils are equal, round, and reactive to light. EOM are normal.     Cardiovascular: Normal rate and regular rhythm.     Psych/Behavior: He is alert. He is oriented to person, place, and time. He has a normal mood and affect.     Musculoskeletal:        Back: Range of motion is limited. There is tenderness. Muscle strength is 5/5.       Right Lower Extremities: Range of motion is full. There is no tenderness. Muscle strength is 5/5. Tone is normal.        Left Lower Extremities: There is no tenderness. Muscle  strength is 5/5. Tone is normal.     Neurological:        Sensory: There is no sensory deficit in the trunk. There is no sensory deficit in the extremities.        Cranial nerves: Cranial nerve(s) II, III, IV, V, VI, VII, VIII, IX, X, XI and XII are intact.     General    Nursing note and vitals reviewed.  Constitutional: He is oriented to person, place, and time. He appears well-nourished. No distress.   Eyes: EOM are normal. Pupils are equal, round, and reactive to light.   Cardiovascular:  Normal rate and regular rhythm.            Neurological: He is alert and oriented to person, place, and time.   Psychiatric: He has a normal mood and affect.             Ortho Exam        Results for orders placed during the hospital encounter of 07/25/22    MRI Lumbar Spine Without Contrast    Narrative  EXAMINATION:  MRI LUMBAR SPINE WITHOUT CONTRAST    CLINICAL HISTORY:  Low back pain, symptoms persist with > 6wks conservative treatment; Dorsalgia, unspecified    TECHNIQUE:  Multiplanar, multisequence MR images were acquired from the thoracolumbar junction to the sacrum without the administration of contrast.    COMPARISON:  None.    FINDINGS:  The vertebral body heights and alignment are maintained throughout the lumbar spine.  No abnormal vertebral body marrow signal.  Multilevel partial intervertebral disc desiccation.  The conus is normal in signal intensity terminating at the level of the L1 vertebral body.    L1-2: Intervertebral disc bulge with right foraminal disc protrusion. Mild facet hypertrophy and ligamentum flavum thickening.  Mild bilateral neural foraminal narrowing.  No thecal sac narrowing.    L2-3: Intervertebral disc bulge.  Facet hypertrophy and ligamentum flavum thickening.  Mild bilateral neural foraminal narrowing.  No thecal sac narrowing.    L3-4: Intervertebral disc bulge.  Mild facet hypertrophy and ligamentum flavum thickening.  The thecal sac is borderline measuring 10 mm.  Moderate bilateral  neural foraminal narrowing.    L4-5: Intervertebral disc bulge.  Moderate facet hypertrophy and ligamentum flavum thickening.  Moderate bilateral neural foraminal narrowing.  No thecal sac narrowing.    L5-S1: Intervertebral disc bulge.  Mild facet hypertrophy and ligamentum flavum thickening.  Mild bilateral neural foraminal narrowing.  No thecal sac narrowing.    The paravertebral soft tissues are unremarkable.    Impression  Multilevel lumbar spondylosis with moderate neural foraminal narrowing at L3-5.      Electronically signed by: Cristi Centeno  Date:    07/25/2022  Time:    18:36     No results found for this or any previous visit.    No results found for this or any previous visit.         I  reviewed all pertinent imaging regarding this case.  Assessment:     1. Degenerative disc disease, lumbar    2. Degenerative disc disease, cervical    3. Musculoligamentous strain      Plan:     Degenerative disc disease, lumbar    Degenerative disc disease, cervical    Musculoligamentous strain    Mild DDD lumbar spine   He has CT scan ordered for later this month will rtc to discuss after imaging completed       Thank you for the referral   Please call with any questions    Ortiz Scruggs MD  Neurosurgery     Disclaimer: This note was prepared using a voice recognition system and is likely to have sound alike errors within the text.

## 2024-02-28 ENCOUNTER — TELEPHONE (OUTPATIENT)
Dept: NEUROSURGERY | Facility: CLINIC | Age: 69
End: 2024-02-28
Payer: OTHER GOVERNMENT

## 2024-02-28 NOTE — TELEPHONE ENCOUNTER
----- Message from Marilu Spencer sent at 2/28/2024  3:08 PM CST -----  .Type:  Sooner Apoointment Request    Caller is requesting a sooner appointment.  Caller declined first available appointment listed below.  Caller will not accept being placed on the waitlist and is requesting a message be sent to doctor.  Name of Caller:.Billy Seo   When is the first available appointment?04/12/2024  Symptoms:back pain  Would the patient rather a call back or a response via [facility]? Call back  Best Call Back Number:.[phone]   Additional Information:

## 2024-02-28 NOTE — TELEPHONE ENCOUNTER
I spoke with the patient, who is requesting a sooner appointment due to being in pain. I have informed him that the next available is not until April. The patient asked if Dr. Scruggs could call something in to help alleviate the pain. I have informed the patient that the provider does not prescribe pain medication unless the patient has undergone a recent surgical procedure. He states that Dr. Scruggs prescribed me Robaxin 2 years ago, which he completed last Thursday.    _   The patient was told I will send this to the provider, who is in surgery today and resumes clinic tomorrow. If the prescription is approved, he will be notified by his pharmacy. The patient verbalized understanding,

## 2024-02-29 RX ORDER — TIZANIDINE 4 MG/1
4 TABLET ORAL EVERY 8 HOURS
Qty: 20 TABLET | Refills: 0 | Status: SHIPPED | OUTPATIENT
Start: 2024-02-29 | End: 2024-03-10

## 2024-02-29 RX ORDER — TRAMADOL HYDROCHLORIDE 50 MG/1
50 TABLET ORAL EVERY 6 HOURS PRN
Qty: 60 TABLET | Refills: 0 | Status: SHIPPED | OUTPATIENT
Start: 2024-02-29

## 2024-04-09 ENCOUNTER — TELEPHONE (OUTPATIENT)
Dept: NEUROSURGERY | Facility: CLINIC | Age: 69
End: 2024-04-09
Payer: OTHER GOVERNMENT

## 2024-04-09 NOTE — TELEPHONE ENCOUNTER
Spoke with the patient has been rescheduled from 04/12 due to the providers being in surgery. The patient has been scheduled for 05/02 2 PM. The patient has been added to the wait list.

## 2024-05-02 ENCOUNTER — OFFICE VISIT (OUTPATIENT)
Dept: NEUROSURGERY | Facility: CLINIC | Age: 69
End: 2024-05-02
Payer: OTHER GOVERNMENT

## 2024-05-02 VITALS
HEART RATE: 83 BPM | WEIGHT: 186.31 LBS | DIASTOLIC BLOOD PRESSURE: 70 MMHG | SYSTOLIC BLOOD PRESSURE: 142 MMHG | BODY MASS INDEX: 26.73 KG/M2

## 2024-05-02 DIAGNOSIS — M54.2 CERVICALGIA: Primary | ICD-10-CM

## 2024-05-02 PROCEDURE — 99213 OFFICE O/P EST LOW 20 MIN: CPT | Mod: S$PBB,,, | Performed by: PHYSICIAN ASSISTANT

## 2024-05-02 PROCEDURE — 99999 PR PBB SHADOW E&M-EST. PATIENT-LVL IV: CPT | Mod: PBBFAC,,, | Performed by: PHYSICIAN ASSISTANT

## 2024-05-02 PROCEDURE — 99214 OFFICE O/P EST MOD 30 MIN: CPT | Mod: PBBFAC | Performed by: PHYSICIAN ASSISTANT

## 2024-05-02 NOTE — PROGRESS NOTES
Billy Seo is a 67 YO male who presents to clinic today to discuss removal of subcuteanous neck lipoma. He has Neck pain and discomfort popping sounds etc. Neck and Back pain always a longstanding problem.  [place] out [place] the [place] and wishes to have the neck lipoma removed. States he does not want to have cervical spine surgery for his bulging discs. He feels as though the lipoma is painful and bothersome, enlarged and wishes to have it surgically removed.    He denies focal neuro deficits or BB dysfunction. He denies Fevers, chills, SOB, or chest pain. He does have hx of some sort of vascular stents , taking plavix.         Pertinent positive and negative ROS documented above in HPI, all other systems reviewed and found to be negative.         Constitutional/ General: Awake, alert, oriented X 3. Sitting upright in No acute distress. Well developed/well nourished    Neck: trachea midline. Neck ROM intact. Palpable posterior neck lipoma     Respiratory: No increased work of breathing on room air. Chest expansion equal and symmetric.    Neuro: AAO X3 speech is fluent and appripriate CN II-XII grossly intact throughout. EOMI. Face symmetric tongue midline. Shoulder shrug equal and intact BL. Follows commands and moves all extremities antigravity. With good strength throughout      Extremities:No cyanosis clubbing or edema. Ambulating without issues.           A/P: Patient with cervical radiculopathy as well as large posterior subcutaneous neck lipoma. He wishes to have this surgically removed.    I removed his MRI from 2022.       The patient was informed of all benefits and potential risk of the operation including but not limited to:  Pain, infection, bleeding, coma, paralysis, death.  No guarantee was given that this procedure would alleviate all of the symptoms.     He agrees to proceed.    Will obtain new MRI and will sign consents next time or morning of surgery        Attestation:  Jessenia GOULD  Ranjan BEDOYA have obtained HPI, performed Physical Examination on the above patient, reviewed the pertinent labs, tests, imaging, other relevant data and recorded my findings in this clinic note.

## 2024-05-13 ENCOUNTER — TELEPHONE (OUTPATIENT)
Dept: NEUROSURGERY | Facility: CLINIC | Age: 69
End: 2024-05-13
Payer: OTHER GOVERNMENT

## 2024-05-13 NOTE — TELEPHONE ENCOUNTER
----- Message from Anyi Murphy sent at 5/13/2024  2:06 PM CDT -----  Contact: Radha/nurse with [facility] is calling regarding the upcoming back surgery for the patient and wanting to know which clearance he needs before surgery. Please give her a call back at [phone]

## 2024-05-13 NOTE — TELEPHONE ENCOUNTER
I tried contacting Radha regarding the message that was left regarding sx date and testing. I have informed her at this time the patient is not scheduled for surgery. He is coming in for a F/U with Dr. Scruggs in 06/2024 to discuss imaging results.

## 2024-06-06 ENCOUNTER — HOSPITAL ENCOUNTER (OUTPATIENT)
Dept: RADIOLOGY | Facility: HOSPITAL | Age: 69
Discharge: HOME | End: 2024-06-06
Attending: PHYSICIAN ASSISTANT
Payer: OTHER GOVERNMENT

## 2024-06-06 DIAGNOSIS — M54.2 CERVICALGIA: ICD-10-CM

## 2024-06-06 PROCEDURE — 72141 MRI NECK SPINE W/O DYE: CPT | Mod: TC

## 2024-06-12 ENCOUNTER — TELEPHONE (OUTPATIENT)
Dept: NEUROSURGERY | Facility: CLINIC | Age: 69
End: 2024-06-12
Payer: OTHER GOVERNMENT

## 2024-06-12 NOTE — TELEPHONE ENCOUNTER
I contacted the patient to reschedule the appointment for tomorrow, 06/13 due to the provider performing an emergency surgery tomorrow. The patient has been scheduled for 06/28 at 2:30 PM. The patient confirmed the appointment date and time.

## 2024-06-28 ENCOUNTER — OFFICE VISIT (OUTPATIENT)
Dept: NEUROSURGERY | Facility: CLINIC | Age: 69
End: 2024-06-28
Payer: OTHER GOVERNMENT

## 2024-06-28 VITALS
SYSTOLIC BLOOD PRESSURE: 150 MMHG | DIASTOLIC BLOOD PRESSURE: 75 MMHG | HEART RATE: 73 BPM | BODY MASS INDEX: 26.92 KG/M2 | WEIGHT: 187.63 LBS

## 2024-06-28 DIAGNOSIS — D17.1 LIPOMA OF BACK: Primary | ICD-10-CM

## 2024-06-28 DIAGNOSIS — D17.0 LIPOMA OF NECK: Primary | ICD-10-CM

## 2024-06-28 PROCEDURE — 99999 PR PBB SHADOW E&M-EST. PATIENT-LVL III: CPT | Mod: PBBFAC,,, | Performed by: NEUROLOGICAL SURGERY

## 2024-06-28 PROCEDURE — 99213 OFFICE O/P EST LOW 20 MIN: CPT | Mod: PBBFAC | Performed by: NEUROLOGICAL SURGERY

## 2024-06-28 NOTE — LETTER
June 28, 2024    Billy Seo  [address]  [facility] [address]             [facility] 1st Fl  Neurosurgery  [address]  [place] [address]  Phone: [phone]  Fax: [phone]   June 28, 2024     Patient: Billy Seo   YOB: 1955   Date of Visit: 6/28/2024       To Whom it May Concern:    Billy Seo was seen in my clinic on 6/28/2024. He may return to work on 06/28/2024 .    Please excuse him from any work missed.    If you have any questions or concerns, please don't hesitate to call.    Sincerely,         Ortiz Scruggs MD

## 2024-06-28 NOTE — PROGRESS NOTES
Patient here today for follow-up  Neck pain and lipoma posterior cervical spine  Patient wanting lipoma removed  He has a new MRI of the cervical spine for my review    MRI results    Degenerative changes of the cervical spine causing mild multilevel central canal stenosis with isolated mild ventral cord flattening at C3-C4.  Up to moderate right foraminal stenosis at C5-C6 and C6-C7.     Similar-appearing multiseptated lipoma the right dorsal subcutaneous fat at C6-C7 levels.     Previous notes    Billy Seo is a 67 YO male who presents to clinic today to discuss removal of subcuteanous neck lipoma. He has Neck pain and discomfort popping sounds etc. Neck and Back pain always a longstanding problem.  He is ready to move out of the country and wishes to have the neck lipoma removed. States he does not want to have cervical spine surgery for his bulging discs. He feels as though the lipoma is painful and bothersome, enlarged and wishes to have it surgically removed.    He denies focal neuro deficits or BB dysfunction. He denies Fevers, chills, SOB, or chest pain. He does have hx of some sort of vascular stents , taking plavix.         Pertinent positive and negative ROS documented above in HPI, all other systems reviewed and found to be negative.         Constitutional/ General: Awake, alert, oriented X 3. Sitting upright in No acute distress. Well developed/well nourished    Neck: trachea midline. Neck ROM intact. Palpable posterior neck lipoma     Respiratory: No increased work of breathing on room air. Chest expansion equal and symmetric.    Neuro: AAO X3 speech is fluent and appripriate CN II-XII grossly intact throughout. EOMI. Face symmetric tongue midline. Shoulder shrug equal and intact BL. Follows commands and moves all extremities antigravity. With good strength throughout      Extremities:No cyanosis clubbing or edema. Ambulating without issues.           A/P: Patient with cervical radiculopathy as well  as large posterior subcutaneous neck lipoma. He wishes to have this surgically removed.    I removed his MRI from 2022.     The patient was informed of all benefits and potential risk of the operation including but not limited to:  Pain, infection, bleeding, coma, paralysis, death. the need for additional procedures in the future. No guarantee was given that this procedure would alleviate all of the symptoms.    Consents signed for removal of lipoma    Will call for surgical date in the near future    Thank you for the referral   Please call with any questions    Ortiz Scruggs MD  Neurosurgery     Disclaimer: This note was prepared using a voice recognition system and is likely to have sound alike errors within the text.

## 2024-07-08 ENCOUNTER — TELEPHONE (OUTPATIENT)
Dept: NEUROSURGERY | Facility: CLINIC | Age: 69
End: 2024-07-08
Payer: OTHER GOVERNMENT

## 2024-07-08 NOTE — TELEPHONE ENCOUNTER
----- Message from Qian Bedoya sent at 7/8/2024  9:44 AM CDT -----  Contact: TYRONE FERRARI [[id number]]  ..Type:  Patient Requesting Call    Who Called: TYRONE FERRARI [[id number]]  Does the patient know what this is regarding?: pt calling regarding pre op instructions for 8/5 appt, pt reports pictorial shows getting bolts and nuts inserted and doesn't seem right  Would the patient rather a call back or a response via [facility]?  call  Best Call Back Number: .[phone] (home)  Additional Information:

## 2024-07-08 NOTE — TELEPHONE ENCOUNTER
Spoke to pt, I informed him that his message will be getting sent over to the provider to further assist him and to allow them some time to reply due to them being in surgery today. He verbally understood.

## 2024-07-16 DIAGNOSIS — Z01.818 PRE-OP TESTING: Primary | ICD-10-CM

## 2024-07-18 ENCOUNTER — TELEPHONE (OUTPATIENT)
Dept: NEUROSURGERY | Facility: CLINIC | Age: 69
End: 2024-07-18
Payer: OTHER GOVERNMENT

## 2024-07-18 DIAGNOSIS — D17.0 LIPOMA OF NECK: ICD-10-CM

## 2024-07-18 DIAGNOSIS — Z01.818 PRE-OP TESTING: Primary | ICD-10-CM

## 2024-07-18 NOTE — TELEPHONE ENCOUNTER
----- Message from Karen Valerio RN sent at 7/18/2024  9:10 AM CDT -----  Regarding: sx confusion  Good morning, I called this pt to set up his [facility] appt. He states his sx should be for a cervical fusion not a lipoma excision. He would like to speak with the office. Please let me know when I may call him back. Thank you

## 2024-07-18 NOTE — TELEPHONE ENCOUNTER
I spoke with the patient, who has been scheduled for 07/19 for labs and imaging. The patient confirmed appointment date and time. [facility]. The patient verbalized understanding.

## 2024-07-22 ENCOUNTER — OFFICE VISIT (OUTPATIENT)
Dept: INTERNAL MEDICINE | Facility: CLINIC | Age: 69
End: 2024-07-22
Payer: OTHER GOVERNMENT

## 2024-07-22 ENCOUNTER — TELEPHONE (OUTPATIENT)
Dept: NEUROSURGERY | Facility: CLINIC | Age: 69
End: 2024-07-22
Payer: OTHER GOVERNMENT

## 2024-07-22 ENCOUNTER — HOSPITAL ENCOUNTER (OUTPATIENT)
Dept: CARDIOLOGY | Facility: HOSPITAL | Age: 69
Discharge: HOME | End: 2024-07-22
Attending: NEUROLOGICAL SURGERY
Payer: OTHER GOVERNMENT

## 2024-07-22 ENCOUNTER — HOSPITAL ENCOUNTER (EMERGENCY)
Facility: HOSPITAL | Age: 69
Discharge: HOME | End: 2024-07-22
Attending: EMERGENCY MEDICINE
Payer: OTHER GOVERNMENT

## 2024-07-22 VITALS
SYSTOLIC BLOOD PRESSURE: 204 MMHG | TEMPERATURE: 98 F | HEART RATE: 69 BPM | OXYGEN SATURATION: 97 % | DIASTOLIC BLOOD PRESSURE: 78 MMHG

## 2024-07-22 VITALS
OXYGEN SATURATION: 99 % | HEART RATE: 79 BPM | SYSTOLIC BLOOD PRESSURE: 177 MMHG | BODY MASS INDEX: 26.26 KG/M2 | RESPIRATION RATE: 16 BRPM | DIASTOLIC BLOOD PRESSURE: 84 MMHG | TEMPERATURE: 98 F | WEIGHT: 183 LBS

## 2024-07-22 DIAGNOSIS — M54.16 LUMBAR RADICULOPATHY: ICD-10-CM

## 2024-07-22 DIAGNOSIS — Z01.818 PRE-OP TESTING: ICD-10-CM

## 2024-07-22 DIAGNOSIS — I10 ESSENTIAL HYPERTENSION: ICD-10-CM

## 2024-07-22 DIAGNOSIS — C61 PROSTATE CANCER: ICD-10-CM

## 2024-07-22 DIAGNOSIS — I73.9 PERIPHERAL VASCULAR DISEASE: ICD-10-CM

## 2024-07-22 DIAGNOSIS — R03.0 ELEVATED BLOOD PRESSURE READING: Primary | ICD-10-CM

## 2024-07-22 DIAGNOSIS — D17.0 LIPOMA OF NECK: ICD-10-CM

## 2024-07-22 DIAGNOSIS — N40.0 BPH WITHOUT URINARY OBSTRUCTION: ICD-10-CM

## 2024-07-22 DIAGNOSIS — Z01.818 PREOPERATIVE EXAMINATION: Primary | ICD-10-CM

## 2024-07-22 DIAGNOSIS — Z72.0 TOBACCO USE: ICD-10-CM

## 2024-07-22 DIAGNOSIS — R03.0 ELEVATED BLOOD PRESSURE READING: ICD-10-CM

## 2024-07-22 LAB
OHS QRS DURATION: 140 MS
OHS QTC CALCULATION: 499 MS

## 2024-07-22 PROCEDURE — 93010 ELECTROCARDIOGRAM REPORT: CPT | Mod: ,,, | Performed by: INTERNAL MEDICINE

## 2024-07-22 PROCEDURE — 93005 ELECTROCARDIOGRAM TRACING: CPT

## 2024-07-22 PROCEDURE — 99212 OFFICE O/P EST SF 10 MIN: CPT | Mod: PBBFAC,25

## 2024-07-22 PROCEDURE — 99999 PR PBB SHADOW E&M-EST. PATIENT-LVL II: CPT | Mod: PBBFAC,,,

## 2024-07-22 PROCEDURE — 99283 EMERGENCY DEPT VISIT LOW MDM: CPT | Mod: 25,27

## 2024-07-22 RX ORDER — RIVAROXABAN 2.5 MG/1
2.5 TABLET, FILM COATED ORAL 2 TIMES DAILY WITH MEALS
COMMUNITY

## 2024-07-22 NOTE — PRE ADMISSION SCREENING
Pre op instructions reviewed with patient during Clinic Visit with Provider, verbalized understanding.    To confirm, Surgery is scheduled on 8/05/24. We will call you late afternoon the business day prior to surgery with your arrival time.    *Please report to the [facility] (1st Floor) located off of [facility] (2nd Entrance/Building on the left, in front of the flag pole).  Address: [address] [place] [address] [place]. [address]    INSTRUCTIONS IMPORTANT!!!  DO NOT Eat, Drink, or Smoke after 12 midnight unless instructed otherwise by your Surgeon. OK to brush teeth, no gum, candy or mints!    MORNING OF SURGERY, drink small sip of water with the following medications instructed by Pre-Admit Provider:  - Amlodipine  - Hydralazine  - Flomax  - Eye-drops    *Additional Medication Instructions:  WE WILL CALL YOU WITH XARELTO AND PLAVIX HOLDING INSTRUCTIONS PRIOR TO SURGERY    If you take Ozempic/ Mounjaro / Wegovy / Trulicity / Semaglutide, any weight loss injections OR PHENTERMINE --->>> PLEASE LET US KNOW IMMEDIATELY, as these medications need to be stopped 7 days prior to surgery!    *Patients should HOLD all vitamins, herbal supplements, weight loss medication, aspirin products & NSAIDS 7 days prior to surgery, as these can thin the blood. Ok to take Tylenol.    ____  Avoid Alcoholic beverages 3 days prior to surgery, as it can thin the blood.  ____  NO Acrylic/fake nails or nail polish worn day of surgery (specifically hand/arm & foot surgeries).  ____  NO powder, lotions, deodorants, oils or cream on body.  ____  Remove all jewelry, piercings, & foreign objects prior to arrival and leave at home.  ____  Remove Dentures, Hearing Aids & Contact Lens prior to surgery.  ____  Bring photo ID and insurance information to hospital (Leave Valuables at Home).  ____  If going home the same day, arrange for a ride home. You will not be able to drive for 24 hrs if Anesthesia was used.   ____  Males: Stop ED  medications (Viagra, Cialis) 24 hrs prior to surgery.  ____  Wear clean, loose fitting clothing to allow for dressings/ bandages.    Bathing Instructions:    -Shower with anti-bacterial Soap (ex: Hibiclens or Dial) the night before surgery and the morning of.   -Do not use Hibiclens on your face or genitals.   -Apply clean clothes after shower.  -Do not shave your face morning of surgery   - Do not shave your body 3 days prior to surgery unless instructed otherwise by your Surgeon.    [facility]Ride Policy:  Only 2 adults allowed in pre op/recovery area during your procedure. You MUST HAVE A RIDE HOME from a responsible adult that you know and trust. Medical Transport, Uber or Lyft can ONLY be used if patient has a responsible adult to accompany them during ride home.       *Signs and symptoms of Infection Before or After Surgery:               !!!If you experience any fever, chills, nausea/ vomiting, foul odor/ excessive drainage from surgical site, flu-like symptoms, new wounds or cuts, PLEASE CALL THE SURGEON OFFICE at [phone] or SEND MESSAGE THROUGH [employer] PORTAL!!!     *If you are running late day of surgery, please call the Surgery Dept @ [phone].    *Billing question, please call  [phone]     Thank you,  -[facility] Pre [facility].  [phone]   or [phone]  M-F 7:30 am-4:00 pm (Closed Major Holidays)    Additional Tests Scheduled Today:  EKG (4TH Floor) and CHEST XRAY (1ST Floor) Check in at the

## 2024-07-22 NOTE — DISCHARGE INSTRUCTIONS
Pre op instructions reviewed with patient during Clinic Visit with Provider, verbalized understanding.    To confirm, Surgery is scheduled on 8/05/24. We will call you late afternoon the business day prior to surgery with your arrival time.    *Please report to the [facility] (1st Floor) located off of [facility] (2nd Entrance/Building on the left, in front of the flag pole).  Address: [address] [place] [address] [place]. [address]    INSTRUCTIONS IMPORTANT!!!  DO NOT Eat, Drink, or Smoke after 12 midnight unless instructed otherwise by your Surgeon. OK to brush teeth, no gum, candy or mints!    MORNING OF SURGERY, drink small sip of water with the following medications instructed by Pre-Admit Provider:  - Amlodipine  - Hydralazine  - Flomax  - Eye-drops    *Additional Medication Instructions:  WE WILL CALL YOU WITH XARELTO AND PLAVIX HOLDING INSTRUCTIONS PRIOR TO SURGERY    If you take Ozempic/ Mounjaro / Wegovy / Trulicity / Semaglutide, any weight loss injections OR PHENTERMINE --->>> PLEASE LET US KNOW IMMEDIATELY, as these medications need to be stopped 7 days prior to surgery!    *Patients should HOLD all vitamins, herbal supplements, weight loss medication, aspirin products & NSAIDS 7 days prior to surgery, as these can thin the blood. Ok to take Tylenol.    ____  Avoid Alcoholic beverages 3 days prior to surgery, as it can thin the blood.  ____  NO Acrylic/fake nails or nail polish worn day of surgery (specifically hand/arm & foot surgeries).  ____  NO powder, lotions, deodorants, oils or cream on body.  ____  Remove all jewelry, piercings, & foreign objects prior to arrival and leave at home.  ____  Remove Dentures, Hearing Aids & Contact Lens prior to surgery.  ____  Bring photo ID and insurance information to hospital (Leave Valuables at Home).  ____  If going home the same day, arrange for a ride home. You will not be able to drive for 24 hrs if Anesthesia was used.   ____  Males: Stop ED  medications (Viagra, Cialis) 24 hrs prior to surgery.  ____  Wear clean, loose fitting clothing to allow for dressings/ bandages.    Bathing Instructions:    -Shower with anti-bacterial Soap (ex: Hibiclens or Dial) the night before surgery and the morning of.   -Do not use Hibiclens on your face or genitals.   -Apply clean clothes after shower.  -Do not shave your face morning of surgery   - Do not shave your body 3 days prior to surgery unless instructed otherwise by your Surgeon.    [facility]Ride Policy:  Only 2 adults allowed in pre op/recovery area during your procedure. You MUST HAVE A RIDE HOME from a responsible adult that you know and trust. Medical Transport, Uber or Lyft can ONLY be used if patient has a responsible adult to accompany them during ride home.       *Signs and symptoms of Infection Before or After Surgery:               !!!If you experience any fever, chills, nausea/ vomiting, foul odor/ excessive drainage from surgical site, flu-like symptoms, new wounds or cuts, PLEASE CALL THE SURGEON OFFICE at [phone] or SEND MESSAGE THROUGH [facility] [employer] PORTAL!!!     *If you are running late day of surgery, please call the Surgery Dept @ [phone].    *Billing question, please call  [phone]     Thank you,  -[facility] Pre [facility].  [phone]   or [phone]  M-F 7:30 am-4:00 pm (Closed Major Holidays)    Additional Tests Scheduled Today:  EKG (4TH Floor) and CHEST XRAY (1ST Floor) Check in at the

## 2024-07-23 PROBLEM — F32.A DEPRESSION: Status: ACTIVE | Noted: 2024-07-23

## 2024-07-23 PROBLEM — D17.0 LIPOMA OF NECK: Status: ACTIVE | Noted: 2024-07-23

## 2024-07-23 PROBLEM — E78.5 HYPERLIPIDEMIA: Status: ACTIVE | Noted: 2024-07-23

## 2024-07-23 PROBLEM — M54.16 LUMBAR RADICULOPATHY: Status: ACTIVE | Noted: 2024-07-23

## 2024-07-23 PROBLEM — I10 ESSENTIAL HYPERTENSION: Status: ACTIVE | Noted: 2024-07-23

## 2024-07-23 PROBLEM — C61 PROSTATE CANCER: Status: ACTIVE | Noted: 2024-07-23

## 2024-07-23 PROBLEM — I73.9 PERIPHERAL VASCULAR DISEASE: Status: ACTIVE | Noted: 2024-07-23

## 2024-07-23 PROBLEM — N40.0 BPH WITHOUT URINARY OBSTRUCTION: Status: ACTIVE | Noted: 2024-07-23

## 2024-07-23 NOTE — ASSESSMENT & PLAN NOTE
-Mass Excision planned per Dr. Scruggs on 8/5/24    Known risk factors for perioperative complications: None    Difficulty with intubation is not anticipated.    Cardiac Risk Estimation: Based on the Revised Cardiac Risk index, patient is a Class I risk with a 3.9 % risk of a major cardiac event in a low risk procedure.    1.) Preoperative workup as follows: chest x-ray, ECG, hemoglobin, hematocrit, electrolytes, creatinine, glucose, urinalysis (urinary tract instrumentation planned).  2.) Change in medication regimen before surgery: discontinue ASA 6 days before surgery, discontinue NSAIDs 5 days before surgery, hold Metformin 24 hours prior to surgery. Hold all vitamins/supplements for 7 days prior to surgery, with the exception of Potassium and Iron supplementation. Hold semaglutide/tirzepatide for 7 days prior to surgery.   3.) Prophylaxis for cardiac events with perioperative beta-blockers: not indicated.  4.) Invasive hemodynamic monitoring perioperatively: at the discretion of anesthesiologist.  5.) Deep vein thrombosis prophylaxis postoperatively: regimen to be chosen by surgical team.  6.) Surveillance for postoperative MI with ECG immediately postoperatively and on postoperati ve days 1 and 2 AND troponin levels 24 hours postoperatively and on day 4 or hospital discharge (whichever comes first): at the discretion of anesthesiologist.  7.) Current medications which may produce withdrawal symptoms if withheld perioperatively: None  8.) Other measures: Postoperative hypertension management with IV hydralazine until able to take oral medications.  Postoperative incentive spirometry to prevent pneumonia.

## 2024-07-23 NOTE — ASSESSMENT & PLAN NOTE
- pt BP uncontrolled upon PAT assessment (204/78 and 192/72 upon repeat)  -pt states that he took antihypertensive medications last night   -pt lives in [place], [place] and referred to ED for BP control and further evaluation     -Surgery recommendations:   -Norvasc and Hydralazine continued- may take morning of surgery   -Losartan held morning of surgery

## 2024-07-23 NOTE — ASSESSMENT & PLAN NOTE
Plavix and Xarelto to be held to procedure per recommendations   -S/P peripheral artery angioplasty with stent placement in 2022   -Rosuvastatin continued   -Cardiac clearance requested with recommendations pending

## 2024-07-23 NOTE — ASSESSMENT & PLAN NOTE
-Methocarbamol continued- pt instructed to hold morning of surgery   -vitamins and supplements held x 7 days prior to procedure

## 2024-07-23 NOTE — ASSESSMENT & PLAN NOTE
-controlled and asymptomatic  -pt states he is not taking any medication for management at this time

## 2024-07-23 NOTE — PROGRESS NOTES
Preoperative History and Physical                                                                     Chief Complaint: Preoperative evaluation     History of Present Illness:      Billy Seo is a 68 y.o. male who presents to the office today for a preoperative consultation at the request of Dr. Scruggs who plans on performing a Mass Excision of lipoma of neck on August 5.     Functional Status:      The patient is able to climb a flight of stairs. The patient is able to ambulate without difficulty. The patient's functional status is not affected by the surgical problem. The patient's functional status is not affected by shortness of breath, chest pain, dyspnea on exertion and fatigue.    MET score greater than 4    Past Medical History:      Past Medical History:   Diagnosis Date    Cancer     Depression     Hypertension         Past Surgical History:      Past Surgical History:   Procedure Laterality Date    CIRCUMCISION      at age 48    CYST REMOVAL  2022    from back    CYST REMOVAL Right 05/2024    -[facility]- outpatient    PROSTATE SURGERY          Social History:      Social History     Socioeconomic History    Marital status:    Tobacco Use    Smoking status: Light Smoker     Current packs/day: 0.50     Average packs/day: 0.5 packs/day for 30.6 years (15.3 ttl pk-yrs)     Types: Cigarettes     Start date: 1994    Smokeless tobacco: Current   Substance and Sexual Activity    Alcohol use: Yes     Comment: -rarely    Drug use: Yes     Types: Marijuana     Comment: THC gummies- last use 7/20/24     Social Determinants of Health     Stress: Stress Concern Present (11/18/2020)    [employer] [place] [employer] - Occupational Stress Questionnaire     Feeling of Stress : Rather much        Family History:      Family History   Problem Relation Name Age of Onset    Stroke Mother      Heart disease Father      Diabetes Sister      Stroke  Sister         Allergies:      Review of patient's allergies indicates:   Allergen Reactions    Atorvastatin Swelling    Ciprofloxacin      Other reaction(s): Lips swell    Darunavir     Erythromycin Other (See Comments)     Listless    Lopid  [gemfibrozil] Itching    Lovastatin      Other reaction(s): Arthralgias    Simvastatin      Other reaction(s): Arthralgias    Sulfa (sulfonamide antibiotics)     Zonisamide      Other reaction(s): Eye Swelling, O/E - lip swelling, Eye Swelling, O/E - lip swelling       Medications:      Current Outpatient Medications   Medication Sig    amLODIPine (NORVASC) 10 MG tablet 1 tablet    cloNIDine (CATAPRES) 0.1 MG tablet Take 0.1 mg by mouth.    clopidogreL (PLAVIX) 75 mg tablet Take 75 mg by mouth once daily.    hydrALAZINE (APRESOLINE) 25 MG tablet 1 tablet with food    latanoprost 0.005 % ophthalmic solution INSTILL 1 DROP BOTH EYES AT BEDTIME FOR GLAUCOMA (STORE PRODUCT IN THE REFRIGERATOR)    losartan (COZAAR) 100 MG tablet 1 tablet    methocarbamoL (ROBAXIN) 750 MG Tab Take 1 tablet (750 mg total) by mouth 3 (three) times daily as needed (muscle spasms).    olopatadine (PATANOL) 0.1 % ophthalmic solution Place 1 drop into both eyes 2 (two) times daily.    rivaroxaban (XARELTO) 2.5 mg Tab Take 2.5 mg by mouth 2 (two) times daily with meals.    rosuvastatin (CRESTOR) 20 MG tablet 1 tablet    tadalafiL (CIALIS) 20 MG Tab TAKE ONE TABLET BY MOUTH AS DIRECTED AT LEAST 1 HOUR PRIOR TO INTERCOURSE NOT TO EXCEED 1 DOSE IN 24 HOURS FOR ERECTILE ISSUES    tamsulosin (FLOMAX) 0.4 mg Cap TAKE TWO CAPSULES BY MOUTH EVERY DAY FOR URINATION. TAKE WITH FOOD.     No current facility-administered medications for this visit.       Vitals:      Vitals:    07/22/24 1435   BP: (!) 204/78   Pulse: 69   Temp: 98.2 °F (36.8 °C)       Review of Systems:        Constitutional: Negative for fever, chills, weight loss, malaise/fatigue and diaphoresis.   HENT: Negative for hearing loss, ear pain,  nosebleeds, congestion, sore throat, tinnitus and ear discharge.+ neck pain  Eyes: Negative for blurred vision, double vision, photophobia, pain, discharge and redness.   Respiratory: Negative for cough, hemoptysis, sputum production, shortness of breath, wheezing and stridor.    Cardiovascular: Negative for chest pain, palpitations, orthopnea, claudication, leg swelling and PND.   Gastrointestinal: Negative for heartburn, nausea, vomiting, abdominal pain, diarrhea, constipation, blood in stool and melena.   Genitourinary: Negative for dysuria, urgency, frequency, hematuria and flank pain.   Musculoskeletal: Negative for myalgias, joint pain and falls. + back pain    Skin: Negative for itching and rash.   Neurological: Negative for dizziness, tingling, tremors, sensory change, speech change, focal weakness, seizures, loss of consciousness, weakness and headaches.   Endo/Heme/Allergies: Negative for environmental allergies and polydipsia. Does not bruise/bleed easily.   Psychiatric/Behavioral: Negative for depression, suicidal ideas, hallucinations, memory loss and substance abuse. The patient is not nervous/anxious and does not have insomnia.    All 14 systems reviewed and negative except as noted above.    Physical Exam:      Constitutional: Appears well-developed, well-nourished and in no acute distress.  Patient is oriented to person, place, and time.   Head: Normocephalic and atraumatic. Mucous membranes moist.  Neck: Neck supple. Lipoma present to posterior cervical spine  Cardiovascular: Normal rate and regular rhythm.  S1 S2 appreciated by ascultation.  Pulmonary/Chest: Effort normal and clear to auscultation bilaterally. No respiratory distress.   Abdomen: Soft. Non-tender and non-distended. Bowel sounds are normal.   Neurological: Patient is alert and oriented to person, place and time. Moves all extremities.  Skin: Warm and dry. No lesions.  Extremities: No clubbing, cyanosis or edema.    Laboratory data:       Reviewed and noted in plan where applicable. Please see chart for full laboratory data.    Lab Results   Component Value Date    INR 1.0 07/22/2024       Lab Results   Component Value Date    WBC 7.30 07/22/2024    HGB 13.4 (L) 07/22/2024    HCT 43.8 07/22/2024    MCV 85 07/22/2024     07/22/2024           Sodium   Date Value Ref Range Status   07/22/2024 144 136 - 145 mmol/L Final     Chloride   Date Value Ref Range Status   07/22/2024 109 95 - 110 mmol/L Final     CO2   Date Value Ref Range Status   07/22/2024 24 23 - 29 mmol/L Final     Glucose   Date Value Ref Range Status   07/22/2024 97 70 - 110 mg/dL Final     BUN   Date Value Ref Range Status   07/22/2024 8 8 - 23 mg/dL Final     Creatinine   Date Value Ref Range Status   07/22/2024 1.2 0.5 - 1.4 mg/dL Final     Calcium   Date Value Ref Range Status   07/22/2024 9.2 8.7 - 10.5 mg/dL Final     Total Protein   Date Value Ref Range Status   07/22/2024 7.8 6.0 - 8.4 g/dL Final     Albumin   Date Value Ref Range Status   07/22/2024 4.1 3.5 - 5.2 g/dL Final     Total Bilirubin   Date Value Ref Range Status   07/22/2024 0.3 0.1 - 1.0 mg/dL Final     Comment:     For infants and newborns, interpretation of results should be based  on gestational age, weight and in agreement with clinical  observations.    Premature Infant recommended reference ranges:  Up to 24 hours.............<8.0 mg/dL  Up to 48 hours............<12.0 mg/dL  3-5 days..................<15.0 mg/dL  6-29 days.................<15.0 mg/dL       Alkaline Phosphatase   Date Value Ref Range Status   07/22/2024 75 55 - 135 U/L Final     AST   Date Value Ref Range Status   07/22/2024 16 10 - 40 U/L Final     ALT   Date Value Ref Range Status   07/22/2024 12 10 - 44 U/L Final     Anion Gap   Date Value Ref Range Status   07/22/2024 11 8 - 16 mmol/L Final     eGFR   Date Value Ref Range Status   07/22/2024 >60.0 >60 mL/min/1.73 m^2 Final           Predictors of intubation difficulty:       Morbid  obesity? no   Anatomically abnormal facies? no   Prominent incisors? no   Receding mandible? no   Short, thick neck? no   Neck range of motion: normal   Dentition: No chipped, loose, or missing teeth.  Based on the Modified Mallampati, patient is a mallampati score: I (soft palate, uvula, fauces, and tonsillar pillars visible)    Cardiographics:      ECG: Sinus rhythm with Premature atrial complexes   Right bundle branch block   Possible  Septal infarct ,age undetermined   Abnormal ECG   Echocardiogram: not indicated    Imaging:      Chest x-ray: No acute abnormality     Assessment and Plan:      Lipoma of neck  -Mass Excision of lipoma of neck planned per Dr. Scruggs on 8/5/24    Known risk factors for perioperative complications: None    Difficulty with intubation is not anticipated.    Cardiac Risk Estimation: Based on the Revised Cardiac Risk index, patient is a Class I risk with a 3.9 % risk of a major cardiac event in a low risk procedure.    1.) Preoperative workup as follows: chest x-ray, ECG, hemoglobin, hematocrit, electrolytes, creatinine, glucose, urinalysis (urinary tract instrumentation planned).  2.) Change in medication regimen before surgery: discontinue ASA 6 days before surgery, discontinue NSAIDs 5 days before surgery, hold Metformin 24 hours prior to surgery. Hold all vitamins/supplements for 7 days prior to surgery, with the exception of Potassium and Iron supplementation. Hold semaglutide/tirzepatide for 7 days prior to surgery.   3.) Prophylaxis for cardiac events with perioperative beta-blockers: not indicated.  4.) Invasive hemodynamic monitoring perioperatively: at the discretion of anesthesiologist.  5.) Deep vein thrombosis prophylaxis postoperatively: regimen to be chosen by surgical team.  6.) Surveillance for postoperative MI with ECG immediately postoperatively and on postoperati ve days 1 and 2 AND troponin levels 24 hours postoperatively and on day 4 or hospital discharge (whichever  comes first): at the discretion of anesthesiologist.  7.) Current medications which may produce withdrawal symptoms if withheld perioperatively: None  8.) Other measures: Postoperative hypertension management with IV hydralazine until able to take oral medications.  Postoperative incentive spirometry to prevent pneumonia.     Essential hypertension  - pt BP uncontrolled upon PAT assessment (204/78 and 192/72 upon repeat)  -pt states that he took antihypertensive medications last night   -pt reports he is no longer taking Clonidine  -pt lives in [place], [place] and referred to ED for BP control and further evaluation     -Surgery recommendations:   -Norvasc and Hydralazine continued- may take morning of surgery   -Losartan held morning of surgery     Depression  -controlled and asymptomatic  -pt states he is not taking any medication for management at this time     BPH without urinary obstruction  -Tamsulosin continued     Hyperlipidemia  -Rosuvastatin continued     Peripheral vascular disease  Plavix and Xarelto to be held to procedure per recommendations   -S/P peripheral artery angioplasty with stent placement in 2022   -Rosuvastatin continued   -pt is followed outpatient by Cardiology in [place]   -Cardiac clearance requested with recommendations pending       Lumbar radiculopathy  -Methocarbamol continued- pt instructed to hold morning of surgery   -vitamins and supplements held x 7 days prior to procedure     Prostate cancer  -prostatectomy on 9/29/11     Tobacco use   -pt counseled on smoking cessation for > 3 minutes  -chest xray reviewed     Electronically signed by Heavenly Aguilar MSN, FNP-C on 7/23/2024 at 5:38 PM.

## 2024-07-24 PROBLEM — Z72.0 TOBACCO USE: Status: ACTIVE | Noted: 2024-07-24

## 2024-07-24 NOTE — ED PROVIDER NOTES
Encounter Date: 7/22/2024       History     Chief Complaint   Patient presents with    Anxiety     Pt states he was over at pre op and when they took his BP it read really high. Pt states its anxiety about the procedure he was.      Patient states he got anxious while at preop and his blood pressure was elevated denies chest pain shortness breath blurry vision        Review of patient's allergies indicates:   Allergen Reactions    Atorvastatin Swelling    Ciprofloxacin      Other reaction(s): Lips swell    Darunavir     Erythromycin Other (See Comments)     Listless    Lopid  [gemfibrozil] Itching    Lovastatin      Other reaction(s): Arthralgias    Simvastatin      Other reaction(s): Arthralgias    Sulfa (sulfonamide antibiotics)     Zonisamide      Other reaction(s): Eye Swelling, O/E - lip swelling, Eye Swelling, O/E - lip swelling     Past Medical History:   Diagnosis Date    Cancer     Depression     Hypertension      Past Surgical History:   Procedure Laterality Date    CIRCUMCISION      at age 48    CYST REMOVAL  2022    from back    CYST REMOVAL Right 05/2024    -[facility]- outpatient    PROSTATE SURGERY       Family History   Problem Relation Name Age of Onset    Stroke Mother      Heart disease Father      Diabetes Sister      Stroke Sister       Social History     Tobacco Use    Smoking status: Light Smoker     Current packs/day: 0.50     Average packs/day: 0.5 packs/day for 30.6 years (15.3 ttl pk-yrs)     Types: Cigarettes     Start date: 1994    Smokeless tobacco: Current   Substance Use Topics    Alcohol use: Yes     Comment: -rarely    Drug use: Yes     Types: Marijuana     Comment: THC gummies- last use 7/20/24     Review of Systems   Constitutional:  Negative for fever.   HENT:  Negative for sore throat.    Respiratory:  Negative for shortness of breath.    Cardiovascular:  Negative for chest pain.   Gastrointestinal:  Negative for nausea.   Genitourinary:  Negative for dysuria.    Musculoskeletal:  Negative for back pain.   Skin:  Negative for rash.   Neurological:  Negative for weakness.   Hematological:  Does not bruise/bleed easily.       Physical Exam     Initial Vitals [07/22/24 1559]   BP Pulse Resp Temp SpO2   (!) 177/84 79 16 97.9 °F (36.6 °C) 99 %      MAP       --         Physical Exam    Nursing note and vitals reviewed.  Constitutional: He appears well-developed and well-nourished.   HENT:   Head: Normocephalic and atraumatic.   Eyes: Conjunctivae are normal. Pupils are equal, round, and reactive to light.   Neck: Neck supple.   Normal range of motion.  Cardiovascular:  Normal rate, regular rhythm, normal heart sounds and intact distal pulses.           Pulmonary/Chest: Breath sounds normal.   Abdominal: Abdomen is soft. There is no rebound and no guarding.   Musculoskeletal:         General: Normal range of motion.      Cervical back: Normal range of motion and neck supple.     Neurological: He is alert.   Skin: Skin is warm and dry.   Psychiatric: He has a normal mood and affect. His behavior is normal. Thought content normal.         ED Course   Procedures  Labs Reviewed - No data to display       Imaging Results              X-Ray Chest 1 View (Final result)  Result time 07/22/24 16:47:57      Final result by Eden Segundo MD (07/22/24 16:47:57)                   Impression:      No acute abnormality.      Electronically signed by: Eden Segundo  Date:    07/22/2024  Time:    16:47               Narrative:    EXAMINATION:  XR CHEST 1 VIEW    CLINICAL HISTORY:  . Elevated blood-pressure reading, without diagnosis of hypertension    TECHNIQUE:  Single frontal portable view of the chest was performed.    COMPARISON:  None    FINDINGS:  Support devices: None    The lungs are clear, with normal appearance of pulmonary vasculature and no pleural effusion or pneumothorax.                                       Medications - No data to display  Medical Decision  Making  Differential diagnosis considered but not limited to; anxiety kind elevated blood pressure reading    Amount and/or Complexity of Data Reviewed  Radiology: ordered.                                      Clinical Impression:  Final diagnoses:  [R03.0] Elevated blood pressure reading (Primary)  [R03.0] Elevated blood pressure reading - Preop testing          ED Disposition Condition    Discharge Stable          ED Prescriptions    None       Follow-up Information       Follow up With Specialties Details Why Contact Info    pcp  Schedule an appointment as soon as possible for a visit  As needed              Federico Brewster NP  07/24/24 8697

## 2024-08-02 ENCOUNTER — TELEPHONE (OUTPATIENT)
Dept: PREADMISSION TESTING | Facility: HOSPITAL | Age: 69
End: 2024-08-02
Payer: OTHER GOVERNMENT

## 2024-08-02 NOTE — TELEPHONE ENCOUNTER
Called and spoke with Pt about the following:     Please arrive to [facility] ([facility] at 5:30 am on 8/5/2024 for your scheduled procedure.  Address: [address] [place] [address] [place] [address] ([address] Building on left, 1st Floor Lobby)  >>>NO eating or drinking after midnight unless instructed otherwise by your Surgeon<<<

## 2024-08-05 ENCOUNTER — ANESTHESIA (OUTPATIENT)
Dept: SURGERY | Facility: HOSPITAL | Age: 69
End: 2024-08-05
Payer: OTHER GOVERNMENT

## 2024-08-05 ENCOUNTER — HOSPITAL ENCOUNTER (OUTPATIENT)
Facility: HOSPITAL | Age: 69
Discharge: HOME | End: 2024-08-05
Attending: NEUROLOGICAL SURGERY | Admitting: NEUROLOGICAL SURGERY
Payer: OTHER GOVERNMENT

## 2024-08-05 ENCOUNTER — ANESTHESIA EVENT (OUTPATIENT)
Dept: SURGERY | Facility: HOSPITAL | Age: 69
End: 2024-08-05
Payer: OTHER GOVERNMENT

## 2024-08-05 DIAGNOSIS — D17.0 LIPOMA OF NECK: Primary | ICD-10-CM

## 2024-08-05 PROCEDURE — 21555 EXC NECK LES SC < 3 CM: CPT | Mod: ,,, | Performed by: NEUROLOGICAL SURGERY

## 2024-08-05 PROCEDURE — 37000008 HC ANESTHESIA 1ST 15 MINUTES: Performed by: NEUROLOGICAL SURGERY

## 2024-08-05 PROCEDURE — 36000706: Performed by: NEUROLOGICAL SURGERY

## 2024-08-05 PROCEDURE — 25000003 PHARM REV CODE 250: Performed by: NURSE ANESTHETIST, CERTIFIED REGISTERED

## 2024-08-05 PROCEDURE — 36000707: Performed by: NEUROLOGICAL SURGERY

## 2024-08-05 PROCEDURE — 88304 TISSUE EXAM BY PATHOLOGIST: CPT | Performed by: STUDENT IN AN ORGANIZED HEALTH CARE EDUCATION/TRAINING PROGRAM

## 2024-08-05 PROCEDURE — 71000033 HC RECOVERY, INTIAL HOUR: Performed by: NEUROLOGICAL SURGERY

## 2024-08-05 PROCEDURE — 37000009 HC ANESTHESIA EA ADD 15 MINS: Performed by: NEUROLOGICAL SURGERY

## 2024-08-05 PROCEDURE — 25000003 PHARM REV CODE 250: Performed by: NEUROLOGICAL SURGERY

## 2024-08-05 PROCEDURE — 88304 TISSUE EXAM BY PATHOLOGIST: CPT | Mod: 26,,, | Performed by: STUDENT IN AN ORGANIZED HEALTH CARE EDUCATION/TRAINING PROGRAM

## 2024-08-05 PROCEDURE — 71000015 HC POSTOP RECOV 1ST HR: Performed by: NEUROLOGICAL SURGERY

## 2024-08-05 PROCEDURE — 63600175 PHARM REV CODE 636 W HCPCS: Mod: JZ,JG | Performed by: NEUROLOGICAL SURGERY

## 2024-08-05 PROCEDURE — 63600175 PHARM REV CODE 636 W HCPCS: Performed by: NURSE ANESTHETIST, CERTIFIED REGISTERED

## 2024-08-05 PROCEDURE — 25000003 PHARM REV CODE 250: Performed by: ANESTHESIOLOGY

## 2024-08-05 RX ORDER — BACITRACIN ZINC 500 UNIT/G
OINTMENT (GRAM) TOPICAL
Status: DISCONTINUED | OUTPATIENT
Start: 2024-08-05 | End: 2024-08-05 | Stop reason: HOSPADM

## 2024-08-05 RX ORDER — CEFAZOLIN SODIUM 1 G/3ML
INJECTION, POWDER, FOR SOLUTION INTRAMUSCULAR; INTRAVENOUS
Status: DISCONTINUED | OUTPATIENT
Start: 2024-08-05 | End: 2024-08-05

## 2024-08-05 RX ORDER — ONDANSETRON HYDROCHLORIDE 2 MG/ML
4 INJECTION, SOLUTION INTRAVENOUS ONCE AS NEEDED
Status: DISCONTINUED | OUTPATIENT
Start: 2024-08-05 | End: 2024-08-05 | Stop reason: HOSPADM

## 2024-08-05 RX ORDER — ONDANSETRON HYDROCHLORIDE 2 MG/ML
4 INJECTION, SOLUTION INTRAVENOUS DAILY PRN
Status: DISCONTINUED | OUTPATIENT
Start: 2024-08-05 | End: 2024-08-05 | Stop reason: HOSPADM

## 2024-08-05 RX ORDER — FENTANYL CITRATE 50 UG/ML
INJECTION, SOLUTION INTRAMUSCULAR; INTRAVENOUS
Status: DISCONTINUED | OUTPATIENT
Start: 2024-08-05 | End: 2024-08-05

## 2024-08-05 RX ORDER — MEPERIDINE HYDROCHLORIDE 25 MG/ML
12.5 INJECTION INTRAMUSCULAR; INTRAVENOUS; SUBCUTANEOUS ONCE AS NEEDED
Status: DISCONTINUED | OUTPATIENT
Start: 2024-08-05 | End: 2024-08-05 | Stop reason: HOSPADM

## 2024-08-05 RX ORDER — LIDOCAINE HYDROCHLORIDE AND EPINEPHRINE 10; 10 MG/ML; UG/ML
30 INJECTION, SOLUTION INFILTRATION; PERINEURAL ONCE
Status: DISCONTINUED | OUTPATIENT
Start: 2024-08-05 | End: 2024-08-05

## 2024-08-05 RX ORDER — MIDAZOLAM HYDROCHLORIDE 1 MG/ML
INJECTION INTRAMUSCULAR; INTRAVENOUS
Status: DISCONTINUED | OUTPATIENT
Start: 2024-08-05 | End: 2024-08-05

## 2024-08-05 RX ORDER — HYDROCODONE BITARTRATE AND ACETAMINOPHEN 5; 325 MG/1; MG/1
1 TABLET ORAL EVERY 4 HOURS PRN
Qty: 42 TABLET | Refills: 0 | Status: SHIPPED | OUTPATIENT
Start: 2024-08-05 | End: 2024-08-12

## 2024-08-05 RX ORDER — PHENYLEPHRINE HYDROCHLORIDE 10 MG/ML
INJECTION INTRAVENOUS
Status: DISCONTINUED | OUTPATIENT
Start: 2024-08-05 | End: 2024-08-05

## 2024-08-05 RX ORDER — HYDROMORPHONE HYDROCHLORIDE 2 MG/ML
0.2 INJECTION, SOLUTION INTRAMUSCULAR; INTRAVENOUS; SUBCUTANEOUS EVERY 5 MIN PRN
Status: DISCONTINUED | OUTPATIENT
Start: 2024-08-05 | End: 2024-08-05 | Stop reason: HOSPADM

## 2024-08-05 RX ORDER — PROPOFOL 10 MG/ML
VIAL (ML) INTRAVENOUS
Status: DISCONTINUED | OUTPATIENT
Start: 2024-08-05 | End: 2024-08-05

## 2024-08-05 RX ORDER — BUPIVACAINE HYDROCHLORIDE 2.5 MG/ML
INJECTION, SOLUTION EPIDURAL; INFILTRATION; INTRACAUDAL
Status: DISCONTINUED | OUTPATIENT
Start: 2024-08-05 | End: 2024-08-05 | Stop reason: HOSPADM

## 2024-08-05 RX ORDER — LIDOCAINE HYDROCHLORIDE AND EPINEPHRINE 10; 10 MG/ML; UG/ML
20 INJECTION, SOLUTION INFILTRATION; PERINEURAL ONCE
Status: DISCONTINUED | OUTPATIENT
Start: 2024-08-05 | End: 2024-08-05 | Stop reason: HOSPADM

## 2024-08-05 RX ORDER — TIZANIDINE 4 MG/1
4 TABLET ORAL EVERY 6 HOURS PRN
Qty: 30 TABLET | Refills: 0 | Status: SHIPPED | OUTPATIENT
Start: 2024-08-05 | End: 2024-08-15

## 2024-08-05 RX ORDER — ROCURONIUM BROMIDE 10 MG/ML
INJECTION, SOLUTION INTRAVENOUS
Status: DISCONTINUED | OUTPATIENT
Start: 2024-08-05 | End: 2024-08-05

## 2024-08-05 RX ORDER — FENTANYL CITRATE 50 UG/ML
25 INJECTION, SOLUTION INTRAMUSCULAR; INTRAVENOUS EVERY 5 MIN PRN
Status: DISCONTINUED | OUTPATIENT
Start: 2024-08-05 | End: 2024-08-05 | Stop reason: HOSPADM

## 2024-08-05 RX ORDER — ONDANSETRON HYDROCHLORIDE 2 MG/ML
INJECTION, SOLUTION INTRAVENOUS
Status: DISCONTINUED | OUTPATIENT
Start: 2024-08-05 | End: 2024-08-05

## 2024-08-05 RX ORDER — LIDOCAINE HYDROCHLORIDE 10 MG/ML
INJECTION, SOLUTION EPIDURAL; INFILTRATION; INTRACAUDAL; PERINEURAL
Status: DISCONTINUED | OUTPATIENT
Start: 2024-08-05 | End: 2024-08-05

## 2024-08-05 RX ORDER — OXYCODONE AND ACETAMINOPHEN 5; 325 MG/1; MG/1
1 TABLET ORAL
Status: DISCONTINUED | OUTPATIENT
Start: 2024-08-05 | End: 2024-08-05 | Stop reason: HOSPADM

## 2024-08-05 RX ADMIN — PHENYLEPHRINE HYDROCHLORIDE 300 MCG: 10 INJECTION INTRAVENOUS at 07:08

## 2024-08-05 RX ADMIN — SODIUM CHLORIDE, SODIUM LACTATE, POTASSIUM CHLORIDE, AND CALCIUM CHLORIDE: .6; .31; .03; .02 INJECTION, SOLUTION INTRAVENOUS at 08:08

## 2024-08-05 RX ADMIN — CEFAZOLIN 2 G: 330 INJECTION, POWDER, FOR SOLUTION INTRAMUSCULAR; INTRAVENOUS at 07:08

## 2024-08-05 RX ADMIN — ONDANSETRON 4 MG: 2 INJECTION INTRAMUSCULAR; INTRAVENOUS at 08:08

## 2024-08-05 RX ADMIN — PROPOFOL 150 MG: 10 INJECTION, EMULSION INTRAVENOUS at 07:08

## 2024-08-05 RX ADMIN — OXYCODONE HYDROCHLORIDE AND ACETAMINOPHEN 1 TABLET: 5; 325 TABLET ORAL at 09:08

## 2024-08-05 RX ADMIN — ROCURONIUM BROMIDE 50 MG: 10 INJECTION, SOLUTION INTRAVENOUS at 07:08

## 2024-08-05 RX ADMIN — MIDAZOLAM HYDROCHLORIDE 2 MG: 1 INJECTION, SOLUTION INTRAMUSCULAR; INTRAVENOUS at 07:08

## 2024-08-05 RX ADMIN — FENTANYL CITRATE 50 MCG: 50 INJECTION, SOLUTION INTRAMUSCULAR; INTRAVENOUS at 07:08

## 2024-08-05 RX ADMIN — SODIUM CHLORIDE, SODIUM LACTATE, POTASSIUM CHLORIDE, AND CALCIUM CHLORIDE: .6; .31; .03; .02 INJECTION, SOLUTION INTRAVENOUS at 07:08

## 2024-08-05 RX ADMIN — LIDOCAINE HYDROCHLORIDE 50 MG: 10 SOLUTION INTRAVENOUS at 07:08

## 2024-08-05 RX ADMIN — SUGAMMADEX 200 MG: 100 INJECTION, SOLUTION INTRAVENOUS at 08:08

## 2024-08-05 RX ADMIN — PHENYLEPHRINE HYDROCHLORIDE 400 MCG: 10 INJECTION INTRAVENOUS at 07:08

## 2024-08-05 NOTE — H&P
Patient here today for follow-up  Neck pain and lipoma posterior cervical spine  Patient wanting lipoma removed  He has a new MRI of the cervical spine for my review    MRI results    Degenerative changes of the cervical spine causing mild multilevel central canal stenosis with isolated mild ventral cord flattening at C3-C4.  Up to moderate right foraminal stenosis at C5-C6 and C6-C7.     Similar-appearing multiseptated lipoma the right dorsal subcutaneous fat at C6-C7 levels.     Previous notes    Billy Seo is a 69 YO male who presents to clinic today to discuss removal of subcuteanous neck lipoma. He has Neck pain and discomfort popping sounds etc. Neck and Back pain always a longstanding problem.  [place] and wishes to have the neck lipoma removed. States he does not want to have cervical spine surgery for his bulging discs. He feels as though the lipoma is painful and bothersome, enlarged and wishes to have it surgically removed.    He denies focal neuro deficits or BB dysfunction. He denies Fevers, chills, SOB, or chest pain. He does have hx of some sort of vascular stents , taking plavix.         Pertinent positive and negative ROS documented above in HPI, all other systems reviewed and found to be negative.         Constitutional/ General: Awake, alert, oriented X 3. Sitting upright in No acute distress. Well developed/well nourished    Neck: trachea midline. Neck ROM intact. Palpable posterior neck lipoma     Respiratory: No increased work of breathing on room air. Chest expansion equal and symmetric.    Neuro: AAO X3 speech is fluent and appripriate CN II-XII grossly intact throughout. EOMI. Face symmetric tongue midline. Shoulder shrug equal and intact BL. Follows commands and moves all extremities antigravity. With good strength throughout      Extremities:No cyanosis clubbing or edema. Ambulating without issues.           A/P: Patient with cervical radiculopathy as well  as large posterior subcutaneous neck lipoma. He wishes to have this surgically removed.    I removed his MRI from 2022.     The patient was informed of all benefits and potential risk of the operation including but not limited to:  Pain, infection, bleeding, coma, paralysis, death. the need for additional procedures in the future. No guarantee was given that this procedure would alleviate all of the symptoms.    Consents signed for removal of lipoma    Will call for surgical date in the near future    Thank you for the referral   Please call with any questions    Ortiz Scruggs MD  Neurosurgery     Disclaimer: This note was prepared using a voice recognition system and is likely to have sound alike errors within the text.

## 2024-08-05 NOTE — DISCHARGE SUMMARY
Neurosurgery discharge summary        Patient is a 67 yo m who presented to [facility] on 8/5/2024 to have his posterior cervical lipoma removed by Dr. Scruggs. He underwent general anesthesia and tolerate procedure well. Lipoma removed and incision was closed 0 vicryls in multi layered skin closed with 3-0 monocryl. Pt. Was extubated  immediately post op and taken to pacu for recovery. No complications.       Indication for procedure:        Patient is a 68-year-old gentleman presented to me with a bothersome superficial lesions of the posterior cervical spine  He had multiple imaging modalities which did reveal likely posterior cervical lipoma on the left lateral aspect of the cervical spine             Physical Exam:  Nursing note and vitals reviewed.     Constitutional: he appears well-nourished. he is not diaphoretic. No distress.      Eyes: Pupils are equal, round, and reactive to light. EOM are normal.      Cardiovascular: Normal rate and regular rhythm.      Psych/Behavior: he is alert. he is oriented to person, place, and time. he has a normal mood and affect.      Musculoskeletal:        Back: Range of motion is limited. There is tenderness. Muscle strength is 5/5.       Right Lower Extremities: Range of motion is full. There is no tenderness. Muscle strength is 5/5. Tone is normal.        Left Lower Extremities: There is no tenderness. Muscle strength is 5/5. Tone is normal.      Neurological:        Sensory: There is no sensory deficit in the trunk. There is no sensory deficit in the extremities.        Cranial nerves: Cranial nerve(s) II, III, IV, V, VI, VII, VIII, IX, X, XI and XII are intact.      General     Nursing note and vitals reviewed.  Constitutional: he is oriented to person, place, and time. he appears well-nourished. No distress.   Eyes: EOM are normal. Pupils are equal, round, and reactive to light.   Cardiovascular:  Normal rate and regular rhythm.            Neurological: he is alert and  oriented to person, place, and time.   Psychiatric: he has a normal mood and affect.           A/P:      S/P removal of lipoma posterior neck. Neuro intact. Medications given for pain and muscle relaxation. Follow up in clinic in 2 weeks.      Pt. cleared To resume blood thinners in 5 days      I discussed wound care and activity restrictions       Attestation:  Jessenia GOULD PA-C have obtained HPI, performed Physical Examination on the above patient, reviewed the pertinent labs, tests, imaging, other relevant data and recorded my findings in this clinic note.      Clear for DC home with friend

## 2024-08-05 NOTE — OP NOTE
[facility] ([facility])  Neurosurgery  Operative Note    SUMMARY      Date of Procedure: 8/5/2024     Procedure: Procedure(s) (LRB):  EXCISION, MASS (N/A)   Posterior cervical lipoma  Surgeons and Role:     * Ortiz Scruggs MD - Primary    Assistant: Ivett Woodruff PA-C     Pre-Operative Diagnosis: Lipoma of neck [D17.0]    Post-Operative Diagnosis: Post-Op Diagnosis Codes:     * Lipoma of neck [D17.0]  Posterior cervical lipoma  Anesthesia: General    Operative Findings (including complications, if any):  Posterior cervical lipoma removed without difficulty    Description of Technical Procedures:  Removal of posterior cervical lipoma    Significant Surgical Tasks Conducted by the Assistant(s), if Applicable:  Assistance with retraction    Estimated Blood Loss (EBL): * No values recorded between 8/5/2024  7:56 AM and 8/5/2024  8:21 AM *           Specimens:   Specimen (24h ago, onward)       Start     Ordered    08/05/24 0803  Specimen to Pathology, Surgery Neurosurgery  Once        Comments: Pre-op Diagnosis: Lipoma of neck [D17.0]Procedure(s):EXCISION, MASS Number of specimens: 1Name of specimens: 1. Cervical lipoma-perm     References:    Click here for ordering Quick Tip   Question Answer Comment   Procedure Type: Neurosurgery    Specimen Class: Routine/Screening    Release to patient Immediate        08/05/24 0818                     Implants: * No implants in log *           Condition: Good    Disposition: PACU - hemodynamically stable.    Attestation: I was present and scrubbed for the entire procedure.    Patient is a 68-year-old gentleman presented to me with a bothersome superficial lesions of the posterior cervical spine  He had multiple imaging modalities which did reveal likely posterior cervical lipoma on the left lateral aspect of the cervical spine    After conservative treatment I did offer him incision for resection of lesion    The patient was informed of all benefits and potential risk of  the operation including but not limited to:  Pain, infection, bleeding, coma, paralysis, death.  Cerebrospinal fluid leak, failure of any instrumentation, the need for additional procedures in the future. No guarantee was given that this procedure would alleviate all of the symptoms.    Consent was signed patient was taken back to the OR anesthesia  Sedated and intubated without any difficulty  He was then rolled prone onto flat top Jesus  Arms were tucked at his side.    Posterior neck was prepped and draped in the normal sterile fashion preoperative antibiotics were given.  Next a 10. Blade was used to make an incision over the right lateral portion of the posterior neck.  Bovie cautery was used to control any superficial bleeders  The retractors were introduced.  Using a Bovie the lipoma was identified and dissection was made around the lesion without difficulty.  The lesion measured approximately 2 cm x 2 cm x 2 cm  This was removed EN bloc  Following this the incision was copiously irrigated with bacitracin solution  2-0 Vicryl were used to close the dead space  2-0 Vicryl was used to close the skin in an inverted fashion  Monocryl was used on the skin    Following procedure patient had a sterile dressing placed superficially  He was rolled supine onto the preoperative stretcher  Extubated with anesthesia  Taken to the recovery room in a stable condition      Thank you for the referral   Please call with any questions    Ortiz Scruggs MD  Neurosurgery     Disclaimer: This note was prepared using a voice recognition system and is likely to have sound alike errors within the text.

## 2024-08-06 VITALS
SYSTOLIC BLOOD PRESSURE: 174 MMHG | OXYGEN SATURATION: 98 % | RESPIRATION RATE: 20 BRPM | HEIGHT: 70 IN | TEMPERATURE: 99 F | BODY MASS INDEX: 26.8 KG/M2 | DIASTOLIC BLOOD PRESSURE: 77 MMHG | WEIGHT: 187.19 LBS | HEART RATE: 67 BPM

## 2024-08-07 LAB
FINAL PATHOLOGIC DIAGNOSIS: NORMAL
GROSS: NORMAL
Lab: NORMAL
MICROSCOPIC EXAM: NORMAL

## 2024-08-12 ENCOUNTER — TELEPHONE (OUTPATIENT)
Dept: NEUROSURGERY | Facility: CLINIC | Age: 69
End: 2024-08-12
Payer: OTHER GOVERNMENT

## 2024-08-12 NOTE — TELEPHONE ENCOUNTER
Spoke with patient informed him that I would reach out to PA to ask about his concerns of having numbness after procedure that was done on August 5th. Patient verbalized understanding.    Message was sent to Ivett about patient concerns. Awaiting response.

## 2024-08-12 NOTE — TELEPHONE ENCOUNTER
Spoke with patient informed him that medication had been sent into pharmacy and that the numbness he's experiencing is to be expected. Patient verbalized understanding.

## 2024-08-12 NOTE — TELEPHONE ENCOUNTER
----- Message from Homar Ibrahim sent at 8/12/2024 10:19 AM CDT -----  Regarding: Advice  Contact: [phone]  Patient is calling to speak with Dr garibay nurse due to he is experiencing numbness. Please contact pt

## 2024-08-22 ENCOUNTER — OFFICE VISIT (OUTPATIENT)
Dept: NEUROSURGERY | Facility: CLINIC | Age: 69
End: 2024-08-22
Payer: MEDICARE

## 2024-08-22 VITALS — HEART RATE: 107 BPM | SYSTOLIC BLOOD PRESSURE: 156 MMHG | DIASTOLIC BLOOD PRESSURE: 89 MMHG

## 2024-08-22 DIAGNOSIS — D17.0 LIPOMA OF NECK: Primary | ICD-10-CM

## 2024-08-22 PROCEDURE — 3077F SYST BP >= 140 MM HG: CPT | Mod: CPTII,S$GLB,, | Performed by: PHYSICIAN ASSISTANT

## 2024-08-22 PROCEDURE — 1101F PT FALLS ASSESS-DOCD LE1/YR: CPT | Mod: CPTII,S$GLB,, | Performed by: PHYSICIAN ASSISTANT

## 2024-08-22 PROCEDURE — 1159F MED LIST DOCD IN RCRD: CPT | Mod: CPTII,S$GLB,, | Performed by: PHYSICIAN ASSISTANT

## 2024-08-22 PROCEDURE — 1125F AMNT PAIN NOTED PAIN PRSNT: CPT | Mod: CPTII,S$GLB,, | Performed by: PHYSICIAN ASSISTANT

## 2024-08-22 PROCEDURE — 4010F ACE/ARB THERAPY RXD/TAKEN: CPT | Mod: CPTII,S$GLB,, | Performed by: PHYSICIAN ASSISTANT

## 2024-08-22 PROCEDURE — 3288F FALL RISK ASSESSMENT DOCD: CPT | Mod: CPTII,S$GLB,, | Performed by: PHYSICIAN ASSISTANT

## 2024-08-22 PROCEDURE — 99024 POSTOP FOLLOW-UP VISIT: CPT | Mod: S$GLB,,, | Performed by: PHYSICIAN ASSISTANT

## 2024-08-22 PROCEDURE — 3079F DIAST BP 80-89 MM HG: CPT | Mod: CPTII,S$GLB,, | Performed by: PHYSICIAN ASSISTANT

## 2024-08-22 PROCEDURE — 99999 PR PBB SHADOW E&M-EST. PATIENT-LVL III: CPT | Mod: PBBFAC,,, | Performed by: PHYSICIAN ASSISTANT

## 2024-08-26 NOTE — PROGRESS NOTES
Mr. Billy Seo is a 68-year-old male who presents to clinic today for his 2 week postoperative.  He recently had a posterior cervical lipoma removed.  He reports incision well healing however he does feel that there is elevated area where the previous lipoma once was.  He denies drainage from his incision fevers chills nausea vomiting or focal weakness.  Overall doing well no concerns today.         Pertinent positive and negative ROS documented above in HPI, all other systems reviewed and found to be negative.       Constitutional/ General: Awake, alert, oriented X 3. Sitting upright in No acute distress. Well developed/well nourished    Neck: trachea midline. Posterior neck incision is CDI. No drainage. Monocryl is dissolving. Is an elevated area where the liopoma once was. No fluctuance and no drainage.     Respiratory: No increased work of breathing on room air. Chest expansion equal and symmetric.    Neuro: AAO X3 speech is fluent and appripriate CN II-XII grossly intact throughout. EOMI. Face symmetric tongue midline. Shoulder shrug equal and intact BL. Follows commands and moves all extremities antigravity.   Extremities:No cyanosis clubbing or edema. Ambulating without issues.           Patient is 2 weeks postop lipoma removed.  No evidence of infection. Healed. He is progressing as expected postoperatively.          I advised him to monitor the incision and massage the tissue paraspinally.        Follow up p.r.n.    Attestation:  IJessenia PA-C have obtained HPI, performed Physical Examination on the above patient, reviewed the pertinent labs, tests, imaging, other relevant data and recorded my findings in this clinic note.

## 2025-04-10 DIAGNOSIS — H25.812 COMBINED FORMS OF AGE-RELATED CATARACT OF LEFT EYE: ICD-10-CM

## 2025-04-10 DIAGNOSIS — H25.813 COMBINED FORMS OF AGE-RELATED CATARACT OF BOTH EYES: Primary | ICD-10-CM

## 2025-04-10 RX ORDER — CYCLOPENTOLATE HYDROCHLORIDE 10 MG/ML
1 SOLUTION/ DROPS OPHTHALMIC
Status: CANCELLED | OUTPATIENT
Start: 2025-04-10

## 2025-04-10 RX ORDER — SODIUM CHLORIDE 0.9 % (FLUSH) 0.9 %
10 SYRINGE (ML) INJECTION
Status: CANCELLED | OUTPATIENT
Start: 2025-04-10

## 2025-04-10 RX ORDER — TETRACAINE HYDROCHLORIDE 5 MG/ML
1 SOLUTION OPHTHALMIC
Status: CANCELLED | OUTPATIENT
Start: 2025-04-10

## 2025-04-10 RX ORDER — PHENYLEPHRINE HYDROCHLORIDE 25 MG/ML
1 SOLUTION/ DROPS OPHTHALMIC
Status: CANCELLED | OUTPATIENT
Start: 2025-04-10

## 2025-04-10 RX ORDER — TROPICAMIDE 10 MG/ML
1 SOLUTION/ DROPS OPHTHALMIC
Status: CANCELLED | OUTPATIENT
Start: 2025-04-10

## (undated) DEVICE — MANIFOLD 4 PORT

## (undated) DEVICE — HALTER DELUXE DISCARD HEAD

## (undated) DEVICE — DRAPE INVISISHIELD TWL 18X24IN

## (undated) DEVICE — COVER LIGHT HANDLE 80/CA

## (undated) DEVICE — ELECTRODE REM PLYHSV RETURN 9

## (undated) DEVICE — NDL ECLIPSE SAFETY 23G 1.5IN

## (undated) DEVICE — TUBE SPEC COLL&TRANSPORT 11ML

## (undated) DEVICE — DRAPE C-ARMOR EQUIPMENT COVER

## (undated) DEVICE — DRAPE MOBILE C-ARM

## (undated) DEVICE — DRAPE CORETEMP FLD WRM 56X62IN

## (undated) DEVICE — ADHESIVE MASTISOL VIAL 48/BX

## (undated) DEVICE — GOWN POLY REINF BRTH SLV XL

## (undated) DEVICE — HEADREST ROUND DISP FOAM 9IN

## (undated) DEVICE — SUT VICRYL PLUS 3-0 SH 18IN

## (undated) DEVICE — NDL SPINAL SPINOCAN 22GX3.5

## (undated) DEVICE — DRAPE INCISE IOBAN 2 23X17IN

## (undated) DEVICE — LOTION DURA PREP REMOVER 40Z

## (undated) DEVICE — SUT VICRYL PLUS 2-0 CT1 18

## (undated) DEVICE — TOWEL OR DISP STRL BLUE 4/PK

## (undated) DEVICE — TUBING MEDI-VAC 20FT .25IN

## (undated) DEVICE — POUCH INSTRUMENT 2 POCKET

## (undated) DEVICE — PACK BASIC SETUP SC BR

## (undated) DEVICE — DRESSING SURGICAL 1/2X1/2

## (undated) DEVICE — DRAPE THREE-QTR REINF 53X77IN

## (undated) DEVICE — SPONGE PATTY SURGICAL .5X3IN

## (undated) DEVICE — DRAPE LAP T SHT W/ INSTR PAD

## (undated) DEVICE — ALCOHOL 70% ANTISEPTIC ISO 4OZ

## (undated) DEVICE — MARKER SKIN RULER STERILE

## (undated) DEVICE — SWAB CULTURETTE II DUAL

## (undated) DEVICE — GLOVE BIOGEL SZ 8 1/2

## (undated) DEVICE — TRAY CATH 1-LYR URIMTR 16FR

## (undated) DEVICE — CORD BIPOLAR 12 FOOT

## (undated) DEVICE — STAPLER SKIN PROXIMATE WIDE

## (undated) DEVICE — SUT VICRYL PLUS 0 CT1 18IN

## (undated) DEVICE — TRAY SKIN SCRUB WET 4 COMPART

## (undated) DEVICE — TOOL MR8 MATCH HEAD 14CM 3MM

## (undated) DEVICE — SUT MONOCRYL PLUS UD 3-0 27

## (undated) DEVICE — CONTAINER SPECIMEN OR STER 4OZ